# Patient Record
Sex: FEMALE | Race: WHITE | NOT HISPANIC OR LATINO | Employment: UNEMPLOYED | URBAN - METROPOLITAN AREA
[De-identification: names, ages, dates, MRNs, and addresses within clinical notes are randomized per-mention and may not be internally consistent; named-entity substitution may affect disease eponyms.]

---

## 2017-10-23 ENCOUNTER — HISTORICAL (OUTPATIENT)
Dept: ADMINISTRATIVE | Facility: HOSPITAL | Age: 57
End: 2017-10-23

## 2018-09-25 ENCOUNTER — HISTORICAL (OUTPATIENT)
Dept: ADMINISTRATIVE | Facility: HOSPITAL | Age: 58
End: 2018-09-25

## 2018-09-27 ENCOUNTER — HISTORICAL (OUTPATIENT)
Dept: ADMINISTRATIVE | Facility: HOSPITAL | Age: 58
End: 2018-09-27

## 2018-11-06 ENCOUNTER — HISTORICAL (OUTPATIENT)
Dept: ADMINISTRATIVE | Facility: HOSPITAL | Age: 58
End: 2018-11-06

## 2019-08-01 ENCOUNTER — HISTORICAL (OUTPATIENT)
Dept: ADMINISTRATIVE | Facility: HOSPITAL | Age: 59
End: 2019-08-01

## 2019-09-12 ENCOUNTER — HISTORICAL (OUTPATIENT)
Dept: ADMINISTRATIVE | Facility: HOSPITAL | Age: 59
End: 2019-09-12

## 2019-10-17 ENCOUNTER — HISTORICAL (OUTPATIENT)
Dept: ADMINISTRATIVE | Facility: HOSPITAL | Age: 59
End: 2019-10-17

## 2019-11-07 ENCOUNTER — HISTORICAL (OUTPATIENT)
Dept: ADMINISTRATIVE | Facility: HOSPITAL | Age: 59
End: 2019-11-07

## 2022-04-09 ENCOUNTER — HISTORICAL (OUTPATIENT)
Dept: ADMINISTRATIVE | Facility: HOSPITAL | Age: 62
End: 2022-04-09

## 2022-04-27 VITALS
SYSTOLIC BLOOD PRESSURE: 118 MMHG | DIASTOLIC BLOOD PRESSURE: 81 MMHG | BODY MASS INDEX: 54.69 KG/M2 | HEIGHT: 55 IN | WEIGHT: 236.31 LBS | OXYGEN SATURATION: 94 %

## 2022-04-30 NOTE — PROGRESS NOTES
Patient:   Andreea Thompson            MRN: [id number]            FIN: [id number]               Age:   58 years     Sex:  Female     :  1960   Associated Diagnoses:   None   Author:   Charles Guzman III, MD      History of Present Illness   58-year-old female 3 weeks status post ORIF of the right distal radius. We saw her 2 days ago and she is doing okay but she's had increased pain since then. She denies drainage or fevers. she has not been able obtain a wrist brace secondary to finances. We have written her prescription 2 days ago.      Review of Systems   Constitutional:  No fever, No chills, No sweats.    Eye:  Negative except as documented in history of present illness.    Ear/Nose/Mouth/Throat:  Negative except as documented in history of present illness.    Respiratory:  No shortness of breath, No cough.    Cardiovascular:  No chest pain, No palpitations.    Gastrointestinal:  No nausea, No vomiting, No diarrhea.    Genitourinary:  Negative except as documented in history of present illness.    Hematology/Lymphatics:  Negative except as documented in history of present illness.    Endocrine:  Negative except as documented in history of present illness.    Immunologic:  Negative except as documented in history of present illness.    Musculoskeletal:  Negative except as documented in history of present illness.    Integumentary:  Negative except as documented in history of present illness.    Neurologic:  Alert and oriented X4, No confusion, No numbness, No tingling.       Histories   Past Medical History:    Resolved  HTN - Hypertension ([id number]):  Resolved.  Hernia ([id number]):  Resolved.  COPD (chronic obstructive pulmonary disease) ([id number]):  Resolved.  Goodpasture's syndrome ([id number]):  Resolved.  DVT - Deep vein thrombosis ([id number]):  Resolved.  Chemotherapy ([id number]):  Resolved.   Family History:    Entire family history is negative.   Procedure  history:    ORIF Wrist (Right) on 9/2/2018 at 58 Years.  Comments:  9/2/2018 12:53 - Triston WALLACE, Marisabel Foreman  auto-populated from documented surgical case  ORIF Radius Distal (Left) on 9/14/2017 at 57 Years.  Comments:  9/14/2017 19:35 - Jono WALLACE, Srinivasa Rodriguez  auto-populated from documented surgical case  Hiatus hernia repair ([id number]).  tonsillectomy.  Adenoidectomy ([id number]).  labia removal.  Biopsy of lung ([id number]).  Kidney biopsy ([id number]).   Social History        Social & Psychosocial Habits    Alcohol  08/02/2014 Risk Assessment: Denies Alcohol Use    03/04/2017  Use: Current    Type: Wine    Frequency: 1-2 times per month    Substance Abuse  08/02/2014 Risk Assessment: Denies Substance Abuse    05/16/2018  Use: Never    Tobacco  08/02/2014 Risk Assessment: High Risk    09/27/2018  Use: Former smoker    Smoking Cessation Counseling No  .        Physical Examination   right upper extremity: Her incision is beautifully healed. There is no erythema there is no induration, there is no sign of infection. She is able to flex and extend all of her fingers and thumb though it is painful. It appears that the pain is mostly in the tendons of the wrist. she has brisk capillary refill of all her digits. She has some mild numbness that is diffuse in different regions of the hand. Nothing specific to the median nerve      Review / Management   x-rays: Multiple x-rays of the right wrist are obtained today. There is no loss of reduction. The hardware does not appear loose. Nothing appears to have failed      Impression and Plan   58-year-old female with right wrist pain 3 weeks after ORIF of a right distal radius fracture  -I discussed with her that I do not see signs of infection clinically. Her x-rays show preserved alignment and reduction as well as appropriate hardware placement  -We have discussed taking vitamin C to prevent RSD and I have written her prescription for this, though she states she was  supposed to be on vitamin C before but does not appear very compliant with this  -on for so she has not been able to obtain a wrist brace secondary to finances.  I have found a brace in our clinic and fit and applied it myself.  This was done at no charge to the patient  -I recommend that she rest the arm and use ice and elevation to help with the pain.  -I think the numbness she is describing is more likely a result of the nerve block she had in her upper arm. It is not specific to the median nerve and the numbness on the back of her hand would not be explained by a volar David approach to the wrist  -We will check back with her in a week to evaluate her progress

## 2022-09-14 ENCOUNTER — HOSPITAL ENCOUNTER (INPATIENT)
Facility: HOSPITAL | Age: 62
LOS: 3 days | Discharge: HOME HEALTH | DRG: 291 | End: 2022-09-17
Attending: EMERGENCY MEDICINE | Admitting: INTERNAL MEDICINE
Payer: MEDICARE

## 2022-09-14 DIAGNOSIS — Z91.148 NONCOMPLIANCE WITH MEDICATION REGIMEN: ICD-10-CM

## 2022-09-14 DIAGNOSIS — R07.9 CHEST PAIN: ICD-10-CM

## 2022-09-14 DIAGNOSIS — E87.5 HYPERKALEMIA: ICD-10-CM

## 2022-09-14 DIAGNOSIS — R52 PAIN: ICD-10-CM

## 2022-09-14 DIAGNOSIS — R79.89 ELEVATED BRAIN NATRIURETIC PEPTIDE (BNP) LEVEL: ICD-10-CM

## 2022-09-14 DIAGNOSIS — R06.00 DYSPNEA: ICD-10-CM

## 2022-09-14 DIAGNOSIS — I50.811 ACUTE RIGHT-SIDED CONGESTIVE HEART FAILURE: ICD-10-CM

## 2022-09-14 DIAGNOSIS — J96.21 ACUTE ON CHRONIC RESPIRATORY FAILURE WITH HYPOXEMIA: Primary | ICD-10-CM

## 2022-09-14 LAB
ALBUMIN SERPL-MCNC: 3.5 GM/DL (ref 3.4–4.8)
ALBUMIN/GLOB SERPL: 0.9 RATIO (ref 1.1–2)
ALP SERPL-CCNC: 98 UNIT/L (ref 40–150)
ALT SERPL-CCNC: 11 UNIT/L (ref 0–55)
AST SERPL-CCNC: 31 UNIT/L (ref 5–34)
BASOPHILS # BLD AUTO: 0.08 X10(3)/MCL (ref 0–0.2)
BASOPHILS NFR BLD AUTO: 0.8 %
BILIRUBIN DIRECT+TOT PNL SERPL-MCNC: 0.2 MG/DL
BNP BLD-MCNC: 3117.5 PG/ML
BUN SERPL-MCNC: 31.2 MG/DL (ref 9.8–20.1)
CALCIUM SERPL-MCNC: 9.3 MG/DL (ref 8.4–10.2)
CHLORIDE SERPL-SCNC: 107 MMOL/L (ref 98–107)
CO2 SERPL-SCNC: 27 MMOL/L (ref 23–31)
CREAT SERPL-MCNC: 1.55 MG/DL (ref 0.55–1.02)
D DIMER PPP IA.FEU-MCNC: 0.74 UG/ML FEU (ref 0–0.5)
EOSINOPHIL # BLD AUTO: 0.3 X10(3)/MCL (ref 0–0.9)
EOSINOPHIL NFR BLD AUTO: 2.9 %
ERYTHROCYTE [DISTWIDTH] IN BLOOD BY AUTOMATED COUNT: 14.9 % (ref 11.5–17)
FLUAV AG UPPER RESP QL IA.RAPID: NOT DETECTED
FLUBV AG UPPER RESP QL IA.RAPID: NOT DETECTED
GFR SERPLBLD CREATININE-BSD FMLA CKD-EPI: 37 MLS/MIN/1.73/M2
GLOBULIN SER-MCNC: 4 GM/DL (ref 2.4–3.5)
GLUCOSE SERPL-MCNC: 125 MG/DL (ref 82–115)
HCT VFR BLD AUTO: 46.1 % (ref 37–47)
HGB BLD-MCNC: 12.4 GM/DL (ref 12–16)
IMM GRANULOCYTES # BLD AUTO: 0.06 X10(3)/MCL (ref 0–0.04)
IMM GRANULOCYTES NFR BLD AUTO: 0.6 %
LYMPHOCYTES # BLD AUTO: 1.59 X10(3)/MCL (ref 0.6–4.6)
LYMPHOCYTES NFR BLD AUTO: 15.5 %
MACROCYTES BLD QL SMEAR: ABNORMAL
MCH RBC QN AUTO: 28.4 PG (ref 27–31)
MCHC RBC AUTO-ENTMCNC: 26.9 MG/DL (ref 33–36)
MCV RBC AUTO: 105.7 FL (ref 80–94)
MONOCYTES # BLD AUTO: 0.53 X10(3)/MCL (ref 0.1–1.3)
MONOCYTES NFR BLD AUTO: 5.2 %
NEUTROPHILS # BLD AUTO: 7.7 X10(3)/MCL (ref 2.1–9.2)
NEUTROPHILS NFR BLD AUTO: 75 %
NRBC BLD AUTO-RTO: 0 %
PLATELET # BLD AUTO: 233 X10(3)/MCL (ref 130–400)
PLATELET # BLD EST: NORMAL 10*3/UL
PMV BLD AUTO: 11.1 FL (ref 7.4–10.4)
POTASSIUM SERPL-SCNC: 5.8 MMOL/L (ref 3.5–5.1)
PROT SERPL-MCNC: 7.5 GM/DL (ref 5.8–7.6)
RBC # BLD AUTO: 4.36 X10(6)/MCL (ref 4.2–5.4)
RBC MORPH BLD: ABNORMAL
SARS-COV-2 RNA RESP QL NAA+PROBE: NOT DETECTED
SODIUM SERPL-SCNC: 138 MMOL/L (ref 136–145)
TROPONIN I SERPL-MCNC: 0.01 NG/ML (ref 0–0.04)
WBC # SPEC AUTO: 10.3 X10(3)/MCL (ref 4.5–11.5)

## 2022-09-14 PROCEDURE — 25000242 PHARM REV CODE 250 ALT 637 W/ HCPCS: Performed by: EMERGENCY MEDICINE

## 2022-09-14 PROCEDURE — 63600175 PHARM REV CODE 636 W HCPCS: Performed by: NURSE PRACTITIONER

## 2022-09-14 PROCEDURE — 63600175 PHARM REV CODE 636 W HCPCS: Performed by: EMERGENCY MEDICINE

## 2022-09-14 PROCEDURE — 84484 ASSAY OF TROPONIN QUANT: CPT | Performed by: PHYSICIAN ASSISTANT

## 2022-09-14 PROCEDURE — 85025 COMPLETE CBC W/AUTO DIFF WBC: CPT | Performed by: PHYSICIAN ASSISTANT

## 2022-09-14 PROCEDURE — 83880 ASSAY OF NATRIURETIC PEPTIDE: CPT | Performed by: PHYSICIAN ASSISTANT

## 2022-09-14 PROCEDURE — 93010 ELECTROCARDIOGRAM REPORT: CPT | Mod: ,,, | Performed by: INTERNAL MEDICINE

## 2022-09-14 PROCEDURE — 94640 AIRWAY INHALATION TREATMENT: CPT

## 2022-09-14 PROCEDURE — 25500020 PHARM REV CODE 255: Performed by: EMERGENCY MEDICINE

## 2022-09-14 PROCEDURE — 80053 COMPREHEN METABOLIC PANEL: CPT | Performed by: PHYSICIAN ASSISTANT

## 2022-09-14 PROCEDURE — 99291 CRITICAL CARE FIRST HOUR: CPT | Mod: 25

## 2022-09-14 PROCEDURE — 11000001 HC ACUTE MED/SURG PRIVATE ROOM

## 2022-09-14 PROCEDURE — 93005 ELECTROCARDIOGRAM TRACING: CPT

## 2022-09-14 PROCEDURE — 93010 EKG 12-LEAD: ICD-10-PCS | Mod: ,,, | Performed by: INTERNAL MEDICINE

## 2022-09-14 PROCEDURE — 85379 FIBRIN DEGRADATION QUANT: CPT | Performed by: PHYSICIAN ASSISTANT

## 2022-09-14 PROCEDURE — 96374 THER/PROPH/DIAG INJ IV PUSH: CPT

## 2022-09-14 PROCEDURE — 25000003 PHARM REV CODE 250: Performed by: NURSE PRACTITIONER

## 2022-09-14 PROCEDURE — 87636 SARSCOV2 & INF A&B AMP PRB: CPT | Performed by: PHYSICIAN ASSISTANT

## 2022-09-14 PROCEDURE — 36415 COLL VENOUS BLD VENIPUNCTURE: CPT | Performed by: PHYSICIAN ASSISTANT

## 2022-09-14 RX ORDER — MAG HYDROX/ALUMINUM HYD/SIMETH 200-200-20
30 SUSPENSION, ORAL (FINAL DOSE FORM) ORAL 4 TIMES DAILY PRN
Status: DISCONTINUED | OUTPATIENT
Start: 2022-09-14 | End: 2022-09-17 | Stop reason: HOSPADM

## 2022-09-14 RX ORDER — IBUPROFEN 200 MG
24 TABLET ORAL
Status: DISCONTINUED | OUTPATIENT
Start: 2022-09-14 | End: 2022-09-17 | Stop reason: HOSPADM

## 2022-09-14 RX ORDER — PRAVASTATIN SODIUM 40 MG/1
40 TABLET ORAL DAILY PRN
COMMUNITY
Start: 2022-08-23 | End: 2023-03-18

## 2022-09-14 RX ORDER — NALOXONE HCL 0.4 MG/ML
0.02 VIAL (ML) INJECTION
Status: DISCONTINUED | OUTPATIENT
Start: 2022-09-14 | End: 2022-09-17 | Stop reason: HOSPADM

## 2022-09-14 RX ORDER — LOSARTAN POTASSIUM 50 MG/1
50 TABLET ORAL DAILY
COMMUNITY
Start: 2022-08-23 | End: 2023-03-18

## 2022-09-14 RX ORDER — POLYETHYLENE GLYCOL 3350 17 G/17G
17 POWDER, FOR SOLUTION ORAL DAILY
Status: DISCONTINUED | OUTPATIENT
Start: 2022-09-15 | End: 2022-09-17 | Stop reason: HOSPADM

## 2022-09-14 RX ORDER — MIRTAZAPINE 30 MG/1
30 TABLET, FILM COATED ORAL NIGHTLY
COMMUNITY
Start: 2022-08-02

## 2022-09-14 RX ORDER — FLUTICASONE FUROATE, UMECLIDINIUM BROMIDE AND VILANTEROL TRIFENATATE 100; 62.5; 25 UG/1; UG/1; UG/1
1 POWDER RESPIRATORY (INHALATION) DAILY
COMMUNITY
Start: 2022-07-05 | End: 2023-03-18

## 2022-09-14 RX ORDER — ACETAMINOPHEN 325 MG/1
650 TABLET ORAL EVERY 6 HOURS PRN
Status: DISCONTINUED | OUTPATIENT
Start: 2022-09-14 | End: 2022-09-17 | Stop reason: HOSPADM

## 2022-09-14 RX ORDER — ALBUTEROL SULFATE 90 UG/1
2 AEROSOL, METERED RESPIRATORY (INHALATION) EVERY 6 HOURS PRN
COMMUNITY
Start: 2021-12-08

## 2022-09-14 RX ORDER — SIMETHICONE 80 MG
1 TABLET,CHEWABLE ORAL 4 TIMES DAILY PRN
Status: DISCONTINUED | OUTPATIENT
Start: 2022-09-14 | End: 2022-09-17 | Stop reason: HOSPADM

## 2022-09-14 RX ORDER — IPRATROPIUM BROMIDE AND ALBUTEROL SULFATE 2.5; .5 MG/3ML; MG/3ML
3 SOLUTION RESPIRATORY (INHALATION)
Status: COMPLETED | OUTPATIENT
Start: 2022-09-14 | End: 2022-09-14

## 2022-09-14 RX ORDER — ALPRAZOLAM 0.5 MG/1
1 TABLET ORAL ONCE
Status: COMPLETED | OUTPATIENT
Start: 2022-09-14 | End: 2022-09-14

## 2022-09-14 RX ORDER — OXYBUTYNIN CHLORIDE 5 MG/1
5 TABLET ORAL DAILY
COMMUNITY
Start: 2022-08-23

## 2022-09-14 RX ORDER — ENOXAPARIN SODIUM 100 MG/ML
40 INJECTION SUBCUTANEOUS EVERY 24 HOURS
Status: DISCONTINUED | OUTPATIENT
Start: 2022-09-14 | End: 2022-09-17 | Stop reason: HOSPADM

## 2022-09-14 RX ORDER — METOPROLOL TARTRATE 25 MG/1
TABLET, FILM COATED ORAL
COMMUNITY
Start: 2021-09-20 | End: 2023-03-18

## 2022-09-14 RX ORDER — IBUPROFEN 200 MG
16 TABLET ORAL
Status: DISCONTINUED | OUTPATIENT
Start: 2022-09-14 | End: 2022-09-17 | Stop reason: HOSPADM

## 2022-09-14 RX ORDER — IPRATROPIUM BROMIDE AND ALBUTEROL SULFATE 2.5; .5 MG/3ML; MG/3ML
3 SOLUTION RESPIRATORY (INHALATION) EVERY 6 HOURS PRN
COMMUNITY
End: 2024-03-25

## 2022-09-14 RX ORDER — ACETAZOLAMIDE 250 MG/1
TABLET ORAL DAILY PRN
COMMUNITY
Start: 2022-08-23

## 2022-09-14 RX ORDER — FUROSEMIDE 10 MG/ML
40 INJECTION INTRAMUSCULAR; INTRAVENOUS
Status: COMPLETED | OUTPATIENT
Start: 2022-09-14 | End: 2022-09-14

## 2022-09-14 RX ORDER — FOLIC ACID 1 MG/1
1000 TABLET ORAL DAILY
COMMUNITY
Start: 2022-08-23

## 2022-09-14 RX ORDER — IPRATROPIUM BROMIDE AND ALBUTEROL SULFATE 2.5; .5 MG/3ML; MG/3ML
3 SOLUTION RESPIRATORY (INHALATION) EVERY 4 HOURS PRN
Status: DISCONTINUED | OUTPATIENT
Start: 2022-09-14 | End: 2022-09-17 | Stop reason: HOSPADM

## 2022-09-14 RX ORDER — FUROSEMIDE 20 MG/1
20 TABLET ORAL DAILY PRN
COMMUNITY
Start: 2021-09-20

## 2022-09-14 RX ORDER — CYANOCOBALAMIN 1000 UG/ML
INJECTION, SOLUTION INTRAMUSCULAR; SUBCUTANEOUS
COMMUNITY
Start: 2021-10-20

## 2022-09-14 RX ORDER — AMLODIPINE BESYLATE 5 MG/1
1 TABLET ORAL DAILY
COMMUNITY
Start: 2022-01-14 | End: 2023-03-17

## 2022-09-14 RX ORDER — ONDANSETRON 2 MG/ML
4 INJECTION INTRAMUSCULAR; INTRAVENOUS EVERY 4 HOURS PRN
Status: DISCONTINUED | OUTPATIENT
Start: 2022-09-14 | End: 2022-09-17 | Stop reason: HOSPADM

## 2022-09-14 RX ORDER — PANTOPRAZOLE SODIUM 40 MG/1
40 TABLET, DELAYED RELEASE ORAL DAILY
COMMUNITY
Start: 2022-08-23

## 2022-09-14 RX ORDER — FLUOXETINE HYDROCHLORIDE 40 MG/1
80 CAPSULE ORAL
COMMUNITY

## 2022-09-14 RX ORDER — HYDROCODONE BITARTRATE AND ACETAMINOPHEN 10; 325 MG/1; MG/1
1 TABLET ORAL ONCE
Status: COMPLETED | OUTPATIENT
Start: 2022-09-14 | End: 2022-09-14

## 2022-09-14 RX ORDER — TALC
6 POWDER (GRAM) TOPICAL NIGHTLY PRN
Status: DISCONTINUED | OUTPATIENT
Start: 2022-09-14 | End: 2022-09-17 | Stop reason: HOSPADM

## 2022-09-14 RX ORDER — GLUCAGON 1 MG
1 KIT INJECTION
Status: DISCONTINUED | OUTPATIENT
Start: 2022-09-14 | End: 2022-09-17 | Stop reason: HOSPADM

## 2022-09-14 RX ORDER — ALPRAZOLAM 2 MG/1
2 TABLET ORAL 2 TIMES DAILY
COMMUNITY
Start: 2022-09-08

## 2022-09-14 RX ORDER — ALENDRONATE SODIUM 70 MG/1
TABLET ORAL
COMMUNITY
Start: 2021-09-29 | End: 2023-03-18

## 2022-09-14 RX ORDER — PROCHLORPERAZINE EDISYLATE 5 MG/ML
5 INJECTION INTRAMUSCULAR; INTRAVENOUS EVERY 6 HOURS PRN
Status: DISCONTINUED | OUTPATIENT
Start: 2022-09-14 | End: 2022-09-17 | Stop reason: HOSPADM

## 2022-09-14 RX ORDER — HYDROCODONE BITARTRATE AND ACETAMINOPHEN 10; 325 MG/1; MG/1
1 TABLET ORAL 2 TIMES DAILY PRN
COMMUNITY
Start: 2022-09-08

## 2022-09-14 RX ORDER — FUROSEMIDE 10 MG/ML
40 INJECTION INTRAMUSCULAR; INTRAVENOUS EVERY 8 HOURS
Status: DISCONTINUED | OUTPATIENT
Start: 2022-09-14 | End: 2022-09-16

## 2022-09-14 RX ADMIN — FUROSEMIDE 40 MG: 10 INJECTION, SOLUTION INTRAMUSCULAR; INTRAVENOUS at 05:09

## 2022-09-14 RX ADMIN — FUROSEMIDE 40 MG: 10 INJECTION, SOLUTION INTRAMUSCULAR; INTRAVENOUS at 10:09

## 2022-09-14 RX ADMIN — IOPAMIDOL 100 ML: 755 INJECTION, SOLUTION INTRAVENOUS at 06:09

## 2022-09-14 RX ADMIN — HYDROCODONE BITARTRATE AND ACETAMINOPHEN 1 TABLET: 10; 325 TABLET ORAL at 10:09

## 2022-09-14 RX ADMIN — IPRATROPIUM BROMIDE AND ALBUTEROL SULFATE 3 ML: 2.5; .5 SOLUTION RESPIRATORY (INHALATION) at 05:09

## 2022-09-14 RX ADMIN — ENOXAPARIN SODIUM 40 MG: 40 INJECTION SUBCUTANEOUS at 10:09

## 2022-09-14 RX ADMIN — ALPRAZOLAM 1 MG: 0.5 TABLET ORAL at 10:09

## 2022-09-14 NOTE — ED PROVIDER NOTES
Encounter Date: 9/14/2022    SCRIBE #1 NOTE: I, Ant Crow, am scribing for, and in the presence of,  Mary Pryor MD. I have scribed the following portions of the note - Other sections scribed: HPI, ROS, physical exam.     History     Chief Complaint   Patient presents with    Dizziness    Shortness of Breath     SOB than usual x 2 days dizziness, COPD, on home oxygen at 2-3lpm via [place], PMH Afib, PE, wears trilogy at night, 89% at 2lpm , increased to 3 in triage, improved to 89-90%, per pt baseline at home     61 y/o female with a history of A-Fib, COPD, and PE presenting to the ED for SOB which is chronic but has been gradually worsening for about a month. Pt also states she had an episode of dizziness and weakness this morning. Pt does report some worsened leg swelling, cough, and decreased appetite but denies any CP or chest pressure. Pt states she had a PE 3-4 months ago at which time she had chest pressure which she denies currently. Pt states she is on 2-4L O2 at baseline but feels like she needs more currently. She states she has nebulizer treatments at home which help. Pt states she is supposed to take lasix and diamox but states she hasn't been taking the lasix because it makes her urinate too much. She also states she has not been taking her bloodthinners x 1 month because they are too expensive.     Pt's PCP is Jamaal Whitney MD. Her cardiologist is Deandre Salguero MD.    The history is provided by the patient. No  was used.   Shortness of Breath  This is a chronic problem. The problem occurs continuously.The current episode started more than 1 week ago. The problem has been gradually worsening. Associated symptoms include cough, orthopnea (chronic) and leg swelling. Pertinent negatives include no fever, no rhinorrhea, no sore throat, no wheezing, no chest pain and no abdominal pain. Treatments tried: nebulizers. The treatment provided moderate relief. She has had Prior  hospitalizations. She has had Prior ED visits. Associated medical issues include COPD and PE.   Review of patient's allergies indicates:  No Known Allergies  Past Medical History:   Diagnosis Date    COPD (chronic obstructive pulmonary disease)     Paroxysmal atrial fibrillation      No past surgical history on file.  No family history on file.     Review of Systems   Constitutional:  Positive for appetite change (decreased). Negative for activity change, diaphoresis, fatigue and fever.   HENT:  Negative for congestion, postnasal drip, rhinorrhea, sinus pain, sneezing and sore throat.    Respiratory:  Positive for cough and shortness of breath. Negative for chest tightness and wheezing.    Cardiovascular:  Positive for orthopnea (chronic) and leg swelling. Negative for chest pain and palpitations.   Gastrointestinal:  Negative for abdominal distention, abdominal pain and blood in stool.   Genitourinary:  Negative for decreased urine volume, difficulty urinating and dysuria.   Musculoskeletal: Negative.    Skin:  Negative for color change and pallor.   Neurological:  Positive for light-headedness. Negative for dizziness, speech difficulty, weakness and numbness.   All other systems reviewed and are negative.    Physical Exam     Initial Vitals [09/14/22 1258]   BP Pulse Resp Temp SpO2   115/77 64 18 98.2 °F (36.8 °C) (!) 89 %      MAP       --         Physical Exam    Nursing note and vitals reviewed.  Constitutional: She appears well-developed and well-nourished. She is not diaphoretic. No distress.   HENT:   Head: Normocephalic and atraumatic.   Nose: Nose normal.   Mouth/Throat: Oropharynx is clear and moist.   Eyes: Conjunctivae and EOM are normal. Pupils are equal, round, and reactive to light.   Neck: Trachea normal. Neck supple.   Normal range of motion.  Cardiovascular:  Normal rate, regular rhythm, normal heart sounds and intact distal pulses.           No murmur heard.  Pulmonary/Chest: Tachypnea noted. She  is in respiratory distress. She has no wheezes. She has no rhonchi. She has rales in the right lower field and the left lower field. She exhibits no tenderness.   Prolonged expiratory phase   Abdominal: Abdomen is soft. Bowel sounds are normal. She exhibits no distension and no mass. There is no abdominal tenderness. There is no rebound and no guarding.   Musculoskeletal:         General: No tenderness or edema. Normal range of motion.      Cervical back: Normal range of motion and neck supple.      Lumbar back: Normal. Normal range of motion.     Neurological: She is alert and oriented to person, place, and time. She has normal strength. No cranial nerve deficit or sensory deficit.   Skin: Skin is warm and dry. Capillary refill takes less than 2 seconds. No abscess noted. No erythema. No pallor.   Psychiatric: She has a normal mood and affect. Her behavior is normal. Judgment and thought content normal.       ED Course   Critical Care    Date/Time: 9/14/2022 8:17 PM  Performed by: Mary Pryor MD  Authorized by: Mary Pryor MD   Direct patient critical care time: 15 minutes  Additional history critical care time: 5 minutes  Ordering / reviewing critical care time: 10 minutes  Documentation critical care time: 10 minutes  Consulting other physicians critical care time: 5 minutes  Total critical care time (exclusive of procedural time) : 45 minutes  Critical care was necessary to treat or prevent imminent or life-threatening deterioration of the following conditions: cardiac failure, respiratory failure and metabolic crisis.  Critical care was time spent personally by me on the following activities: discussions with consultants, development of treatment plan with patient or surrogate, evaluation of patient's response to treatment, examination of patient, obtaining history from patient or surrogate, ordering and performing treatments and interventions, ordering and review of laboratory studies, ordering  and review of radiographic studies, pulse oximetry, re-evaluation of patient's condition and review of old charts.      Labs Reviewed   COMPREHENSIVE METABOLIC PANEL - Abnormal; Notable for the following components:       Result Value    Potassium Level 5.8 (*)     Glucose Level 125 (*)     Blood Urea Nitrogen 31.2 (*)     Creatinine 1.55 (*)     Globulin 4.0 (*)     Albumin/Globulin Ratio 0.9 (*)     All other components within normal limits   B-TYPE NATRIURETIC PEPTIDE - Abnormal; Notable for the following components:    Natriuretic Peptide 3,117.5 (*)     All other components within normal limits   D DIMER, QUANTITATIVE - Abnormal; Notable for the following components:    D-Dimer 0.74 (*)     All other components within normal limits   CBC WITH DIFFERENTIAL - Abnormal; Notable for the following components:    .7 (*)     MCHC 26.9 (*)     MPV 11.1 (*)     IG# 0.06 (*)     All other components within normal limits   BLOOD SMEAR MICROSCOPIC EXAM (OLG) - Abnormal; Notable for the following components:    RBC Morph Abnormal (*)     Macrocyte 1+ (*)     All other components within normal limits   COVID/FLU A&B PCR - Normal   TROPONIN I - Normal   CBC W/ AUTO DIFFERENTIAL    Narrative:     The following orders were created for panel order CBC auto differential.  Procedure                               Abnormality         Status                     ---------                               -----------         ------                     CBC with Differential[[id number]]        Abnormal            Final result                 Please view results for these tests on the individual orders.        ECG Results              EKG 12-lead (Final result)  Result time 09/14/22 14:12:19      Final result by Interface, Lab In [facility] (09/14/22 14:12:19)                   Narrative:    Test Reason : R06.00,    Vent. Rate : 065 BPM     Atrial Rate : 065 BPM     P-R Int : 158 ms          QRS Dur : 096 ms      QT Int : 404 ms        P-R-T Axes : 074 106 -60 degrees     QTc Int : 420 ms    Normal sinus rhythm  Rightward axis  Low voltage QRS  Incomplete right bundle branch block  ST and T wave abnormality, consider inferior ischemia  ST and T wave abnormality, consider anterolateral ischemia  Abnormal ECG  No previous ECGs available  Confirmed by Teo Danielle MD ([id number]) on 9/14/2022 2:12:07 PM    Referred By:             Confirmed By:Teo Danielle MD                                  Imaging Results              CTA Chest Non-Coronary(PE Studies) (Final result)  Result time 09/14/22 18:22:48      Final result by Tiara Pennington MD (09/14/22 18:22:48)                   Impression:      No pulmonary embolus is seen but there is evidence of pulmonary artery hypertension and right heart failure    Multiple old left-sided rib fractures      Electronically signed by: Tiara Pennington  Date:    09/14/2022  Time:    18:22               Narrative:    EXAMINATION:  CTA CHEST NON CORONARY    CLINICAL HISTORY:  Pulmonary embolism (PE) suspected, high prob;    TECHNIQUE:  Low dose axial images, sagittal and coronal reformations were obtained from the thoracic inlet to the lung bases following the IV administration of contrast..  Contrast timing was optimized to evaluate the pulmonary arteries.  MIP images were performed.  Automated exposure control was utilized    COMPARISON:  None available    FINDINGS:  There is some chronic appearing interstitial lung changes seen bilaterally.  No pulmonary embolism is seen.  The pulmonary artery is dilated and enlarged consistent with pulmonary artery hypertension.  There is enlargement of the right atrium and reflux of contrast into the inferior vena cava consistent with right heart dysfunction.  No pleural effusion is seen.    There are multiple old left-sided rib fracture seen.  Thoracic aorta appears normal.    Upper abdomen appears grossly unremarkable.                                       X-Ray  Chest 1 View (Final result)  Result time 09/14/22 14:21:11      Final result by Juan Miguel Jefferson MD (09/14/22 14:21:11)                   Impression:      Congestive changes.      Electronically signed by: Juan Miguel Jefferson  Date:    09/14/2022  Time:    14:21               Narrative:    EXAMINATION:  XR CHEST 1 VIEW    CLINICAL HISTORY:  Dyspnea, unspecified    TECHNIQUE:  One view    COMPARISON:  September 20, 2021.    FINDINGS:  Cardiopericardial silhouette enlarged appearance is similar.  Lungs ground-glass opacities reflect mild to moderate congestive process.  There is no focally dense consolidation.  Minimal right and small left pleural effusion.  No pneumothorax.  Multiple old fractures of the left ribs.                                    X-Rays:   Independently Interpreted Readings:   Chest X-Ray: Cardiomegaly present.  Increased vascular markings consistent with CHF are present.   Medications   furosemide injection 40 mg (40 mg Intravenous Given 9/14/22 1700)   albuterol-ipratropium 2.5 mg-0.5 mg/3 mL nebulizer solution 3 mL (3 mLs Nebulization Given 9/14/22 1736)   iopamidoL (ISOVUE-370) injection 100 mL (100 mLs Intravenous Given 9/14/22 1813)     Medical Decision Making:   History:   Old Medical Records: I decided to obtain old medical records.  Old Records Summarized: records from clinic visits.       <> Summary of Records: H/o goodpastures, covid, hypoxia, pe  Initial Assessment:   Shortness of beath, edema  Differential Diagnosis:   Chf, acs, pe, pneumonia, copd exacerbation  Independently Interpreted Test(s):   I have ordered and independently interpreted X-rays - see prior notes.  I have ordered and independently interpreted EKG Reading(s) - see prior notes  Clinical Tests:   Lab Tests: Ordered and Reviewed  Radiological Study: Ordered and Reviewed  Medical Tests: Ordered and Reviewed  ED Management:  Increased oxygen requirement. Lasix, duoneb, cta due to elevated d dimer obtained in triage and lack of  compliance with anticoagulation, labs, admit  Other:   I have discussed this case with another health care provider.        Scribe Attestation:   Scribe #1: I performed the above scribed service and the documentation accurately describes the services I performed. I attest to the accuracy of the note.  Comments: Attending:   Physician Attestation Statement for Scribe #1: IMary MD, personally performed the services described in this documentation. All medical record entries made by the scribe were at my direction and in my presence.  I have reviewed the chart and agree that the record reflects my personal performance and is accurate and complete.        Attending Attestation:           Physician Attestation for Scribe:  Physician Attestation Statement for Scribe #1: IMary MD, reviewed documentation, as scribed by Ant Crow in my presence, and it is both accurate and complete.           ED Course as of 09/14/22 2019   Wed Sep 14, 2022   1748 Awaiting CTA then will admit [BS]      ED Course User Index  [BS] Mary Pryor MD                 Clinical Impression:   Final diagnoses:  [R06.00] Dyspnea  [J96.21] Acute on chronic respiratory failure with hypoxemia (Primary)  [E87.5] Hyperkalemia  [I50.811] Acute right-sided congestive heart failure  [Z91.14] Noncompliance with medication regimen        ED Disposition Condition    Admit Stable                Mary Pryor MD  09/14/22 2019

## 2022-09-14 NOTE — FIRST PROVIDER EVALUATION
Medical screening examination initiated.  I have conducted a focused provider triage encounter, findings are as follows:    Brief history of present illness:  Female with afib, COPD worsening dyspnea sent from clinic to ER for further evaluation out of Eliquis one month.    There were no vitals filed for this visit.    Pertinent physical exam:  AAOx4 female on nasal cannula with short sentences    Brief workup plan:  labs, EKG, imaging    Preliminary workup initiated; this workup will be continued and followed by the physician or advanced practice provider that is assigned to the patient when roomed.

## 2022-09-15 LAB
ALBUMIN SERPL-MCNC: 3.3 GM/DL (ref 3.4–4.8)
ALBUMIN/GLOB SERPL: 1 RATIO (ref 1.1–2)
ALP SERPL-CCNC: 91 UNIT/L (ref 40–150)
ALT SERPL-CCNC: 11 UNIT/L (ref 0–55)
AST SERPL-CCNC: 16 UNIT/L (ref 5–34)
AV INDEX (PROSTH): 0.5
AV MEAN GRADIENT: 6 MMHG
AV PEAK GRADIENT: 11 MMHG
AV VALVE AREA: 1.57 CM2
AV VELOCITY RATIO: 0.47
BASOPHILS # BLD AUTO: 0.09 X10(3)/MCL (ref 0–0.2)
BASOPHILS NFR BLD AUTO: 1 %
BILIRUBIN DIRECT+TOT PNL SERPL-MCNC: <0.5 MG/DL
BUN SERPL-MCNC: 30 MG/DL (ref 9.8–20.1)
CALCIUM SERPL-MCNC: 9 MG/DL (ref 8.4–10.2)
CHLORIDE SERPL-SCNC: 105 MMOL/L (ref 98–107)
CO2 SERPL-SCNC: 29 MMOL/L (ref 23–31)
CREAT SERPL-MCNC: 1.33 MG/DL (ref 0.55–1.02)
CV ECHO LV RWT: 0.48 CM
DOP CALC AO PEAK VEL: 1.64 M/S
DOP CALC AO VTI: 32.3 CM
DOP CALC LVOT AREA: 3.1 CM2
DOP CALC LVOT DIAMETER: 2 CM
DOP CALC LVOT PEAK VEL: 0.77 M/S
DOP CALC LVOT STROKE VOLUME: 50.55 CM3
DOP CALC MV VTI: 14.7 CM
DOP CALCLVOT PEAK VEL VTI: 16.1 CM
E/A RATIO: 1.03
E/E' RATIO: 14.46 M/S
ECHO LV POSTERIOR WALL: 1.05 CM (ref 0.6–1.1)
EJECTION FRACTION: 61 %
EOSINOPHIL # BLD AUTO: 0.34 X10(3)/MCL (ref 0–0.9)
EOSINOPHIL NFR BLD AUTO: 3.9 %
ERYTHROCYTE [DISTWIDTH] IN BLOOD BY AUTOMATED COUNT: 14.9 % (ref 11.5–17)
FRACTIONAL SHORTENING: 33 % (ref 28–44)
GFR SERPLBLD CREATININE-BSD FMLA CKD-EPI: 45 MLS/MIN/1.73/M2
GLOBULIN SER-MCNC: 3.4 GM/DL (ref 2.4–3.5)
GLUCOSE SERPL-MCNC: 100 MG/DL (ref 82–115)
HCT VFR BLD AUTO: 43.4 % (ref 37–47)
HGB BLD-MCNC: 12.6 GM/DL (ref 12–16)
IMM GRANULOCYTES # BLD AUTO: 0.04 X10(3)/MCL (ref 0–0.04)
IMM GRANULOCYTES NFR BLD AUTO: 0.5 %
INTERVENTRICULAR SEPTUM: 1.11 CM (ref 0.6–1.1)
LEFT ATRIUM SIZE: 4.5 CM
LEFT INTERNAL DIMENSION IN SYSTOLE: 2.94 CM (ref 2.1–4)
LEFT VENTRICLE DIASTOLIC VOLUME INDEX: 45.93 ML/M2
LEFT VENTRICLE DIASTOLIC VOLUME: 86.8 ML
LEFT VENTRICLE MASS INDEX: 86 G/M2
LEFT VENTRICLE SYSTOLIC VOLUME INDEX: 17.6 ML/M2
LEFT VENTRICLE SYSTOLIC VOLUME: 33.3 ML
LEFT VENTRICULAR INTERNAL DIMENSION IN DIASTOLE: 4.38 CM (ref 3.5–6)
LEFT VENTRICULAR MASS: 163.42 G
LV LATERAL E/E' RATIO: 11.75 M/S
LV SEPTAL E/E' RATIO: 18.8 M/S
LVOT MG: 1 MMHG
LVOT MV: 0.49 CM/S
LYMPHOCYTES # BLD AUTO: 2.58 X10(3)/MCL (ref 0.6–4.6)
LYMPHOCYTES NFR BLD AUTO: 29.8 %
MAGNESIUM SERPL-MCNC: 1.9 MG/DL (ref 1.6–2.6)
MCH RBC QN AUTO: 28.8 PG (ref 27–31)
MCHC RBC AUTO-ENTMCNC: 29 MG/DL (ref 33–36)
MCV RBC AUTO: 99.1 FL (ref 80–94)
MONOCYTES # BLD AUTO: 0.54 X10(3)/MCL (ref 0.1–1.3)
MONOCYTES NFR BLD AUTO: 6.2 %
MV MEAN GRADIENT: 1 MMHG
MV PEAK A VEL: 0.91 M/S
MV PEAK E VEL: 0.94 M/S
MV PEAK GRADIENT: 1 MMHG
MV STENOSIS PRESSURE HALF TIME: 80 MS
MV VALVE AREA BY CONTINUITY EQUATION: 3.44 CM2
MV VALVE AREA P 1/2 METHOD: 2.75 CM2
NEUTROPHILS # BLD AUTO: 5.1 X10(3)/MCL (ref 2.1–9.2)
NEUTROPHILS NFR BLD AUTO: 58.6 %
NRBC BLD AUTO-RTO: 0 %
PHOSPHATE SERPL-MCNC: 4.5 MG/DL (ref 2.3–4.7)
PISA TR MAX VEL: 3.55 M/S
PLATELET # BLD AUTO: 203 X10(3)/MCL (ref 130–400)
PMV BLD AUTO: 10.9 FL (ref 7.4–10.4)
POTASSIUM SERPL-SCNC: 4.6 MMOL/L (ref 3.5–5.1)
PROT SERPL-MCNC: 6.7 GM/DL (ref 5.8–7.6)
PV PEAK VELOCITY: 1.01 CM/S
RA MAJOR: 6.72 CM
RA PRESSURE: 15 MMHG
RA WIDTH: 5.91 CM
RBC # BLD AUTO: 4.38 X10(6)/MCL (ref 4.2–5.4)
RIGHT VENTRICULAR END-DIASTOLIC DIMENSION: 3.66 CM
SODIUM SERPL-SCNC: 140 MMOL/L (ref 136–145)
TDI LATERAL: 0.08 M/S
TDI SEPTAL: 0.05 M/S
TDI: 0.07 M/S
TR MAX PG: 50 MMHG
TRICUSPID ANNULAR PLANE SYSTOLIC EXCURSION: 1.5 CM
TROPONIN I SERPL-MCNC: 0.01 NG/ML (ref 0–0.04)
TV REST PULMONARY ARTERY PRESSURE: 65 MMHG
WBC # SPEC AUTO: 8.7 X10(3)/MCL (ref 4.5–11.5)

## 2022-09-15 PROCEDURE — 80053 COMPREHEN METABOLIC PANEL: CPT | Performed by: NURSE PRACTITIONER

## 2022-09-15 PROCEDURE — 63600175 PHARM REV CODE 636 W HCPCS: Performed by: INTERNAL MEDICINE

## 2022-09-15 PROCEDURE — 99900031 HC PATIENT EDUCATION (STAT)

## 2022-09-15 PROCEDURE — 27000221 HC OXYGEN, UP TO 24 HOURS

## 2022-09-15 PROCEDURE — 84100 ASSAY OF PHOSPHORUS: CPT | Performed by: NURSE PRACTITIONER

## 2022-09-15 PROCEDURE — 83735 ASSAY OF MAGNESIUM: CPT | Performed by: NURSE PRACTITIONER

## 2022-09-15 PROCEDURE — 25000242 PHARM REV CODE 250 ALT 637 W/ HCPCS: Performed by: NURSE PRACTITIONER

## 2022-09-15 PROCEDURE — 63600175 PHARM REV CODE 636 W HCPCS: Performed by: NURSE PRACTITIONER

## 2022-09-15 PROCEDURE — 85025 COMPLETE CBC W/AUTO DIFF WBC: CPT | Performed by: NURSE PRACTITIONER

## 2022-09-15 PROCEDURE — 94640 AIRWAY INHALATION TREATMENT: CPT

## 2022-09-15 PROCEDURE — 25000003 PHARM REV CODE 250: Performed by: INTERNAL MEDICINE

## 2022-09-15 PROCEDURE — 36415 COLL VENOUS BLD VENIPUNCTURE: CPT | Performed by: NURSE PRACTITIONER

## 2022-09-15 PROCEDURE — 21400001 HC TELEMETRY ROOM

## 2022-09-15 PROCEDURE — 84484 ASSAY OF TROPONIN QUANT: CPT | Performed by: NURSE PRACTITIONER

## 2022-09-15 PROCEDURE — 94761 N-INVAS EAR/PLS OXIMETRY MLT: CPT

## 2022-09-15 PROCEDURE — 25000003 PHARM REV CODE 250: Performed by: NURSE PRACTITIONER

## 2022-09-15 RX ORDER — METOPROLOL TARTRATE 25 MG/1
25 TABLET, FILM COATED ORAL 2 TIMES DAILY
Status: DISCONTINUED | OUTPATIENT
Start: 2022-09-15 | End: 2022-09-17 | Stop reason: HOSPADM

## 2022-09-15 RX ORDER — LOSARTAN POTASSIUM 50 MG/1
50 TABLET ORAL DAILY
Status: DISCONTINUED | OUTPATIENT
Start: 2022-09-15 | End: 2022-09-17 | Stop reason: HOSPADM

## 2022-09-15 RX ORDER — FLUOXETINE HYDROCHLORIDE 20 MG/1
80 CAPSULE ORAL DAILY
Status: DISCONTINUED | OUTPATIENT
Start: 2022-09-15 | End: 2022-09-17 | Stop reason: HOSPADM

## 2022-09-15 RX ORDER — HYDROCODONE BITARTRATE AND ACETAMINOPHEN 10; 325 MG/1; MG/1
1 TABLET ORAL 2 TIMES DAILY PRN
Status: DISCONTINUED | OUTPATIENT
Start: 2022-09-15 | End: 2022-09-17 | Stop reason: HOSPADM

## 2022-09-15 RX ORDER — OXYBUTYNIN CHLORIDE 5 MG/1
5 TABLET ORAL DAILY
Status: DISCONTINUED | OUTPATIENT
Start: 2022-09-15 | End: 2022-09-17 | Stop reason: HOSPADM

## 2022-09-15 RX ORDER — FOLIC ACID 1 MG/1
1000 TABLET ORAL DAILY
Status: DISCONTINUED | OUTPATIENT
Start: 2022-09-15 | End: 2022-09-17 | Stop reason: HOSPADM

## 2022-09-15 RX ORDER — PREDNISONE 5 MG/1
5 TABLET ORAL DAILY
Status: DISCONTINUED | OUTPATIENT
Start: 2022-09-15 | End: 2022-09-17 | Stop reason: HOSPADM

## 2022-09-15 RX ORDER — IPRATROPIUM BROMIDE AND ALBUTEROL SULFATE 2.5; .5 MG/3ML; MG/3ML
3 SOLUTION RESPIRATORY (INHALATION)
Status: DISCONTINUED | OUTPATIENT
Start: 2022-09-15 | End: 2022-09-17 | Stop reason: HOSPADM

## 2022-09-15 RX ORDER — PRAVASTATIN SODIUM 40 MG/1
40 TABLET ORAL DAILY
Status: DISCONTINUED | OUTPATIENT
Start: 2022-09-15 | End: 2022-09-17 | Stop reason: HOSPADM

## 2022-09-15 RX ORDER — PANTOPRAZOLE SODIUM 40 MG/1
40 TABLET, DELAYED RELEASE ORAL DAILY
Status: DISCONTINUED | OUTPATIENT
Start: 2022-09-15 | End: 2022-09-17 | Stop reason: HOSPADM

## 2022-09-15 RX ORDER — PREDNISONE 5 MG/1
5 TABLET ORAL DAILY
COMMUNITY
Start: 2022-08-23 | End: 2023-03-18

## 2022-09-15 RX ORDER — AMLODIPINE BESYLATE 5 MG/1
5 TABLET ORAL DAILY
Status: DISCONTINUED | OUTPATIENT
Start: 2022-09-15 | End: 2022-09-17 | Stop reason: HOSPADM

## 2022-09-15 RX ORDER — ALPRAZOLAM 0.5 MG/1
2 TABLET ORAL 2 TIMES DAILY PRN
Status: DISCONTINUED | OUTPATIENT
Start: 2022-09-15 | End: 2022-09-17 | Stop reason: HOSPADM

## 2022-09-15 RX ORDER — MIRTAZAPINE 15 MG/1
30 TABLET, FILM COATED ORAL NIGHTLY
Status: DISCONTINUED | OUTPATIENT
Start: 2022-09-15 | End: 2022-09-17 | Stop reason: HOSPADM

## 2022-09-15 RX ADMIN — FUROSEMIDE 40 MG: 10 INJECTION, SOLUTION INTRAMUSCULAR; INTRAVENOUS at 06:09

## 2022-09-15 RX ADMIN — PANTOPRAZOLE SODIUM 40 MG: 40 TABLET, DELAYED RELEASE ORAL at 08:09

## 2022-09-15 RX ADMIN — AMLODIPINE BESYLATE 5 MG: 5 TABLET ORAL at 08:09

## 2022-09-15 RX ADMIN — IPRATROPIUM BROMIDE AND ALBUTEROL SULFATE 3 ML: 2.5; .5 SOLUTION RESPIRATORY (INHALATION) at 08:09

## 2022-09-15 RX ADMIN — ENOXAPARIN SODIUM 40 MG: 40 INJECTION SUBCUTANEOUS at 04:09

## 2022-09-15 RX ADMIN — ALPRAZOLAM 2 MG: 0.5 TABLET ORAL at 05:09

## 2022-09-15 RX ADMIN — FLUOXETINE 80 MG: 20 CAPSULE ORAL at 08:09

## 2022-09-15 RX ADMIN — IPRATROPIUM BROMIDE AND ALBUTEROL SULFATE 3 ML: 2.5; .5 SOLUTION RESPIRATORY (INHALATION) at 02:09

## 2022-09-15 RX ADMIN — FUROSEMIDE 40 MG: 10 INJECTION, SOLUTION INTRAMUSCULAR; INTRAVENOUS at 08:09

## 2022-09-15 RX ADMIN — FOLIC ACID 1000 MCG: 1 TABLET ORAL at 08:09

## 2022-09-15 RX ADMIN — PREDNISONE 5 MG: 5 TABLET ORAL at 08:09

## 2022-09-15 RX ADMIN — FUROSEMIDE 40 MG: 10 INJECTION, SOLUTION INTRAMUSCULAR; INTRAVENOUS at 02:09

## 2022-09-15 RX ADMIN — OXYBUTYNIN CHLORIDE 5 MG: 5 TABLET ORAL at 08:09

## 2022-09-15 RX ADMIN — PRAVASTATIN SODIUM 40 MG: 40 TABLET ORAL at 08:09

## 2022-09-15 RX ADMIN — METOPROLOL TARTRATE 25 MG: 25 TABLET, FILM COATED ORAL at 08:09

## 2022-09-15 RX ADMIN — POLYETHYLENE GLYCOL 3350 17 G: 17 POWDER, FOR SOLUTION ORAL at 08:09

## 2022-09-15 RX ADMIN — LOSARTAN POTASSIUM 50 MG: 50 TABLET, FILM COATED ORAL at 08:09

## 2022-09-15 RX ADMIN — MIRTAZAPINE 30 MG: 15 TABLET, FILM COATED ORAL at 08:09

## 2022-09-15 NOTE — PLAN OF CARE
09/15/22 0932   Discharge Assessment   Assessment Type Discharge Planning Assessment   Confirmed/corrected address, phone number and insurance Yes   Confirmed Demographics Correct on Facesheet   Source of Information patient   Lives With child(jenn), adult;spouse   Do you expect to return to your current living situation? Yes   Do you have help at home or someone to help you manage your care at home? Yes   Prior to hospitilization cognitive status: Alert/Oriented   Current cognitive status: Alert/Oriented   Walking or Climbing Stairs Difficulty ambulation difficulty, requires equipment   Mobility Management Cane, walker, WC   Dressing/Bathing Difficulty bathing difficulty, requires equipment   Dressing/Bathing Management Shower chair   Home Accessibility wheelchair accessible   Home Layout Able to live on 1st floor   Equipment Currently Used at Home oxygen;walker, rolling;wheelchair;cane, straight;shower chair;other (see comments)  (Trilogy, O2 via [employer])   Readmission within 30 days? No   Patient currently being followed by outpatient case management? No   Do you currently have service(s) that help you manage your care at home? No   Do you take prescription medications? Yes   Do you have prescription coverage? Yes   Do you have any problems affording any of your prescribed medications? Yes   If yes, what medications? Trelegy and Eliquis   Is the patient taking medications as prescribed? yes   How do you get to doctors appointments? car, drives self;family or friend will provide   Are you on dialysis? No   Do you take coumadin? No   Discharge Plan A Home Health   Discharge Plan B Home with family   DME Needed Upon Discharge  none   Discharge Plan discussed with: Patient   Discharge Barriers Identified None   Patient lives in single story home with spouse and 28 year old son who assist with patient's care as needed. Patient has oxygen at home through [employer]. Patient reports difficulty paying $160 a month for  Trelegy and Eliquis however Dr. Wang and Dr. Carrera are working on samples for patient. Discussed programs through website. Patient uses [facility] on [facility]. Patient reports having home health in the past but was not beneficial for patient. Patient denies any current needs at this time.

## 2022-09-15 NOTE — PROGRESS NOTES
"Inpatient Nutrition Evaluation    Admit Date: 9/14/2022   Total duration of encounter: 1 day    Nutrition Recommendation/Prescription     - Continue heart healthy diet.   - Monitor PO intake.     Nutrition Assessment     Chart Review    Reason Seen: malnutrition screening tool    Diagnosis:  Dyspnea, respiratory failure, hyperkalemia, heart failure    Relevant Medical History: A-Fib, COPD, and PE presenting to the ED for SOB     Nutrition-Related Medications: Folic acid, Lasix, MiraLax, Protonix, Prednisone    Nutrition-Related Labs:  9/15: BUN 30, Creat 1.3, GFR 45    Diet Order: Diet heart healthy  Oral Supplement Order: none at this time  Appetite/Oral Intake: fair/50-75% of meals  Factors Affecting Nutritional Intake: none identified at this time  Food/Anglican/Cultural Preferences: none reported       Wound(s): none noted    Comments    915:  Pt seen for unsure wt loss. Pt does report decreased appetite over the past week. States consuming ~ 1/2 meals. Offered ONS - pt declined, doesn't like milk products. When asked about UBW, pt states "I don't want to talk about that". Noted on lasix.    Anthropometrics    Height: 5' 0.98" (154.9 cm)    Last Weight: 90.7 kg (199 lb 15.3 oz) (09/15/22 1409) Weight Method: Bed Scale (bed scale broken)  BMI (Calculated): 37.8  BMI Classification: obese grade II (BMI 35-39.9)  Ideal Body Weight (IBW), Female: 104.9 lb  UBW: Pt does not want to talk about it.     Wt Readings from Last 5 Encounters:   09/15/22 90.7 kg (199 lb 15.3 oz)   11/07/19 107.2 kg (236 lb 5.3 oz)     Weight Change(s) Since Admission:  Admit Weight: 90.7 kg (200 lb) (09/14/22 2155)      Patient Education    Not applicable.    Monitoring & Evaluation     Dietitian will monitor food and beverage intake.  Nutrition Risk/Follow-Up: low (follow-up in 5-7 days)  Patients assigned 'low nutrition risk' status do not qualify for a full nutritional assessment but will be monitored and re-evaluated in a 5-7 day time " period.

## 2022-09-15 NOTE — PROGRESS NOTES
[facility]  Hospital Medicine Progress Note        Chief Complaint: SOB worse with movement    HPI:   62-year-old female with medical history notable for Good Pasture syndrome on chronic prednisone, chronic hypoxemic respiratory failure on 2-3 L nasal cannula, chronic HFpEF 55-60% (echo September 2021), COPD, remote history of DVT/PE more than 15 years ago, paroxysmal atrial fibrillation diagnosed September 2021, was initiated on Eliquis but stopped taking it due to cost.     Present to the ED with complaint dyspnea on exertion progressing over the past month to dyspnea at rest, orthopnea and PNDs and bilateral lower, extremity swelling.  Denies cough, chest pain, fever or chills.    She is on continuous home O2 (2-4L), but has been requiring more lately.  She has a history of PE approximately 3-4 months ago, but is no longer taking her blood thinners, because she cannot afford her medications.  She also has a history of CHF, which she states she found out about through her PCP 3 months ago, but it appears she was diagnosed with it here back in September 2021.  She states that she stopped taking her lasix  because it would make her urinate too often.      Chest x-ray show pulmonary congestion, EKG sinus rhythm, labs with MARCIAL and elevated BNP and hyperkalemia     Interval Hx:   No worsening shortness of breath reported.  No new complaints reported.    Breathing treatments are helping but not scheduled.    Patient states that she wanted to see if her medications were really needed that is why she was not taking them at home.    She is not interested in Coumadin.  Eliquis cost too much money, she cannot afford it.    Objective/physical exam:  General: Appears comfortable, no acute distress.  Integumentary: Warm, dry, intact.  Neuro:  Alert awake oriented.      Musculoskeletal: Purposeful movement noted.   Respiratory: No accessory muscle use. Breath sounds are equal.  Cardiovascular:  Regular rate. 1+ peripheral edema.    VITAL SIGNS: 24 HRS MIN & MAX LAST   Temp  Min: 97.9 °F (36.6 °C)  Max: 98.2 °F (36.8 °C) 97.9 °F (36.6 °C)   BP  Min: 98/66  Max: 138/88 98/66   Pulse  Min: 53  Max: 69  62   Resp  Min: 15  Max: 24 18   SpO2  Min: 89 %  Max: 98 % 97 %     CTA Chest Non-Coronary(PE Studies)  Narrative: EXAMINATION:  CTA CHEST NON CORONARY    CLINICAL HISTORY:  Pulmonary embolism (PE) suspected, high prob;    TECHNIQUE:  Low dose axial images, sagittal and coronal reformations were obtained from the thoracic inlet to the lung bases following the IV administration of contrast..  Contrast timing was optimized to evaluate the pulmonary arteries.  MIP images were performed.  Automated exposure control was utilized    COMPARISON:  None available    FINDINGS:  There is some chronic appearing interstitial lung changes seen bilaterally.  No pulmonary embolism is seen.  The pulmonary artery is dilated and enlarged consistent with pulmonary artery hypertension.  There is enlargement of the right atrium and reflux of contrast into the inferior vena cava consistent with right heart dysfunction.  No pleural effusion is seen.    There are multiple old left-sided rib fracture seen.  Thoracic aorta appears normal.    Upper abdomen appears grossly unremarkable.  Impression: No pulmonary embolus is seen but there is evidence of pulmonary artery hypertension and right heart failure    Multiple old left-sided rib fractures    Electronically signed by: Tiara Pennington  Date:    09/14/2022  Time:    18:22  X-Ray Chest 1 View  Narrative: EXAMINATION:  XR CHEST 1 VIEW    CLINICAL HISTORY:  Dyspnea, unspecified    TECHNIQUE:  One view    COMPARISON:  September 20, 2021.    FINDINGS:  Cardiopericardial silhouette enlarged appearance is similar.  Lungs ground-glass opacities reflect mild to moderate congestive process.  There is no focally dense consolidation.  Minimal right and small left pleural effusion.  No  pneumothorax.  Multiple old fractures of the left ribs.  Impression: Congestive changes.    Electronically signed by: Juan Miguel Jefferson  Date:    09/14/2022  Time:    14:21    Recent Labs   Lab 09/14/22  1328 09/15/22  0528   WBC 10.3 8.7   RBC 4.36 4.38   HGB 12.4 12.6   HCT 46.1 43.4   .7* 99.1*   MCH 28.4 28.8   MCHC 26.9* 29.0*   RDW 14.9 14.9    203   MPV 11.1* 10.9*       Recent Labs   Lab 09/14/22  1328 09/15/22  0528    140   K 5.8* 4.6   CO2 27 29   BUN 31.2* 30.0*   CREATININE 1.55* 1.33*   CALCIUM 9.3 9.0   MG  --  1.90   ALBUMIN 3.5 3.3*   ALKPHOS 98 91   ALT 11 11   AST 31 16   BILITOT 0.2 <0.5          Microbiology Results (last 7 days)       ** No results found for the last 168 hours. **             See below for Radiology    Scheduled Med:   amLODIPine  5 mg Oral Daily    enoxaparin  40 mg Subcutaneous Daily    FLUoxetine  80 mg Oral Daily    fluticasone-umeclidin-vilanter  1 puff Inhalation Daily    folic acid  1,000 mcg Oral Daily    furosemide (LASIX) injection  40 mg Intravenous Q8H    losartan  50 mg Oral Daily    metoprolol tartrate  25 mg Oral BID    mirtazapine  30 mg Oral QHS    oxybutynin  5 mg Oral Daily    pantoprazole  40 mg Oral Daily    polyethylene glycol  17 g Oral Daily    pravastatin  40 mg Oral Daily    predniSONE  5 mg Oral Daily           PRN Meds:  acetaminophen, albuterol-ipratropium, ALPRAZolam, aluminum-magnesium hydroxide-simethicone, glucagon (human recombinant), glucose, glucose, HYDROcodone-acetaminophen, melatonin, naloxone, ondansetron, prochlorperazine, simethicone     Nutrition Status:      Assessment/Plan:  Decompensated acute on chronic heart failure with preserved ejection fraction  Paroxysmal atrial fibrillation-currently NSR  Acute kidney injury superimposed on CKD 3A  COPD on 2 L home O2  Goodpasture syndrome  CKD III  Labial Cancer s/p removal  GERD  Anemia  Anxiety/Depression  History of DVT and pulmonary embolism    Plan  Continue treatment  for acute CHF exacerbation, IV Lasix therapy, beta-blocker, anticoagulation at appropriate management of blood pressure.    Continue supplemental oxygen as needed wean as tolerated.    Continue telemetry and rate control with Lopressor for atrial fibrillation.    Reviewed and restarted appropriate home medications.     This is a critical care document  Critical Care Diagnosis:  Acute decompensated heart failure.  Critical care interventions: hands on evaluation, review of labs/radiographs/records and discussions; assessing and managing the high probability of imminent or life-threatening deterioration of cardiorespiratory status.  Critical care time spent > 40 minutes.       Anticipated discharge and Disposition:  Pending.     All diagnosis and differential diagnosis have been reviewed; assessment and plan has been documented; I have personally reviewed the labs and test results that are presently available; I have reviewed the patients medication list; I have reviewed the consulting providers response and recommendations. I have reviewed or attempted to review medical records based upon their availability    All of the patient's questions have been  addressed and answered. Patient's is agreeable to the above stated plan.   I will continue to monitor closely and make adjustments to medical management as needed.      Roslyn Anand,    09/15/2022        This note was created with the assistance of Dragon voice recognition software. There may be transcription errors as a result of using this technology however minimal. Effort has been made to assure accuracy of transcription but any obvious errors or omissions should be clarified with the author of the document.

## 2022-09-15 NOTE — H&P
[facility] Hospital Medicine   History & Physical Note      Patient Name: Andreea Thompson  : 1960  MRN: [id number]  Patient Class: IP- Inpatient   Admission Date: 2022   Length of Stay: 1  Admitting Physician:  Service  Attending Physician: Ashleigh Fan MD  PCP: Jamaal Whitney MD  Source of history: Patient, patient's family, and EMR.   Face-to-Face encounter date: 2022  Code status: --Full      Chief Complaint   Dizziness and Shortness of Breath (SOB than usual x 2 days dizziness, COPD, on home oxygen at 2-3lpm via NC, PMH Afib, PE, wears trilogy at night, 89% at 2lpm , increased to 3 in triage, improved to 89-90%, per pt baseline at home)      History of Present Illness   Ms. Thompson is a 62 year old female with multiple comorbidities as outline below who presented to the ED with c/o worsening of her chronic SOB over the last month.  She is on continuous home O2 (2-4L), but has been requiring more lately.  She has a history of PE approximately 3-4 months ago, but is no longer taking her blood thinners, because she cannot afford her medications.  She also has a history of CHF, which she states she found out about through her PCP 3 months ago, but it appears she was diagnosed with it here back in 2021.  She states that she stopped taking her lasix since it would make her urinate too often.  Today's ED lab work revealed a D-dimer of 0.74, K+ 5.8, BUN/Cr 31.2/1.55, BNP 3117.  CXR showed congestive changes. CTA negative for PE, but shows evidence of pulmonary artery hypertension and right heart failure.  She was given a duoneb and lasix in the Ed and admitted to hospital medicine for management.       ROS   Except as documented, all other systems reviewed and negative     Past Medical History   Hypertension  Hyperlipidemia  HFpEF (EF 55-60% 2021)  COPD on 2 L home O2  Goodpasture syndrome  CKD III  Labial Cancer s/p  removal  GERD  Anemia  Anxiety/Depression  Hx: DVT RLE  Hx:  PE    Past Surgical History   ORIF left ankle  ORIF right wrist  ORIF left wrist  Labial removal  Lung biopsy  Hernia repair  Kidney biopsy  Tonsillectomy/Adenoidectomy    Social History     Social History     Tobacco Use    Smoking status: Former     Types: Cigarettes    Smokeless tobacco: Never   Substance Use Topics    Alcohol use: Never        Family History   Reviewed and negative    Allergies   Patient has no known allergies.    Home Medications     Prior to Admission medications    Not on File        Inpatient Medications   Scheduled Meds    Continuous Infusions    PRN Meds      Physical Exam   Vital Signs  Temp:  [98.1 °F (36.7 °C)]   Pulse:  [53-67]   Resp:  [15-24]   BP: (101-138)/(73-96)   SpO2:  [91 %-98 %]       General: Awake, alert, oriented, in no acute distress, non-toxic appearing. Appears comfortable  HEENT: NC/AT  Neck:  Supple  Chest: Mild expiratory wheeze, no accessory muscle use. O2 via NC in place   CVS: Regular rhythm. Normal S1/S2.  Abdomen: nondistended, normoactive BS, soft and non-tender.  MSK: No obvious deformity or joint swelling  Skin: Warm and dry  Neuro: AAOx3, no focal neurological deficit  Psych: Cooperative    Labs     Recent Labs     09/14/22  1328   WBC 10.3   RBC 4.36   HGB 12.4   HCT 46.1   .7*   MCH 28.4   MCHC 26.9*   RDW 14.9        No results for input(s): LACTIC in the last 72 hours.  Recent Labs     09/14/22  1328   D-DIMER 0.74*     No results for input(s): HGBA1C, CHOL, TRIG, LDL, VLDL, HDL in the last 72 hours.   Recent Labs     09/14/22  1328      K 5.8*   CHLORIDE 107   CO2 27   BUN 31.2*   CREATININE 1.55*   GLUCOSE 125*   CALCIUM 9.3   ALBUMIN 3.5   GLOBULIN 4.0*   ALKPHOS 98   ALT 11   AST 31   BILITOT 0.2     Recent Labs     09/14/22  1328   BNP 3,117.5*   TROPONINI 0.011          Microbiology Results (last 7 days)       ** No results found for the last 168 hours. **            Imaging     CTA Chest Non-Coronary(PE Studies)   Final Result      No pulmonary embolus is seen but there is evidence of pulmonary artery hypertension and right heart failure      Multiple old left-sided rib fractures         Electronically signed by: Tiara Pennington   Date:    09/14/2022   Time:    18:22      X-Ray Chest 1 View   Final Result      Congestive changes.         Electronically signed by: Juan Miguel Jefferson   Date:    09/14/2022   Time:    14:21        Assessment & Plan   CHF exacerbation  Atrial fibrillation not on anticoaculation  Elevated D-dimer  MARCIAL on CKD        Plan:  -Telemetry monitoring  -O2 supplementation to keep sats >92%  -Lasix 40 mg TID  -Duonebs q4 prn  -Echo pending  -US NIVA BLE pending  -Daily weights  -Resume home meds as appropriate once reconciled  -Labs in AM      VTE Prophylaxis: Juani Burnett, NP have reviewed and discussed this case with Dr. Fan.  Please see addendum for further assessment and plan from attending MD.

## 2022-09-16 PROCEDURE — 63600175 PHARM REV CODE 636 W HCPCS: Performed by: NURSE PRACTITIONER

## 2022-09-16 PROCEDURE — 25000003 PHARM REV CODE 250: Performed by: INTERNAL MEDICINE

## 2022-09-16 PROCEDURE — 21400001 HC TELEMETRY ROOM

## 2022-09-16 PROCEDURE — 99900035 HC TECH TIME PER 15 MIN (STAT)

## 2022-09-16 PROCEDURE — 94761 N-INVAS EAR/PLS OXIMETRY MLT: CPT

## 2022-09-16 PROCEDURE — 63600175 PHARM REV CODE 636 W HCPCS: Performed by: INTERNAL MEDICINE

## 2022-09-16 PROCEDURE — 94640 AIRWAY INHALATION TREATMENT: CPT

## 2022-09-16 PROCEDURE — 97162 PT EVAL MOD COMPLEX 30 MIN: CPT

## 2022-09-16 PROCEDURE — 27000221 HC OXYGEN, UP TO 24 HOURS

## 2022-09-16 PROCEDURE — 25000242 PHARM REV CODE 250 ALT 637 W/ HCPCS: Performed by: STUDENT IN AN ORGANIZED HEALTH CARE EDUCATION/TRAINING PROGRAM

## 2022-09-16 PROCEDURE — 97166 OT EVAL MOD COMPLEX 45 MIN: CPT

## 2022-09-16 RX ORDER — FUROSEMIDE 40 MG/1
40 TABLET ORAL DAILY
Status: DISCONTINUED | OUTPATIENT
Start: 2022-09-17 | End: 2022-09-17 | Stop reason: HOSPADM

## 2022-09-16 RX ORDER — ADHESIVE BANDAGE
30 BANDAGE TOPICAL DAILY PRN
Status: DISCONTINUED | OUTPATIENT
Start: 2022-09-16 | End: 2022-09-17 | Stop reason: HOSPADM

## 2022-09-16 RX ADMIN — ALPRAZOLAM 2 MG: 0.5 TABLET ORAL at 08:09

## 2022-09-16 RX ADMIN — FOLIC ACID 1000 MCG: 1 TABLET ORAL at 10:09

## 2022-09-16 RX ADMIN — MIRTAZAPINE 30 MG: 15 TABLET, FILM COATED ORAL at 08:09

## 2022-09-16 RX ADMIN — OXYBUTYNIN CHLORIDE 5 MG: 5 TABLET ORAL at 10:09

## 2022-09-16 RX ADMIN — FLUOXETINE 80 MG: 20 CAPSULE ORAL at 10:09

## 2022-09-16 RX ADMIN — PANTOPRAZOLE SODIUM 40 MG: 40 TABLET, DELAYED RELEASE ORAL at 10:09

## 2022-09-16 RX ADMIN — PREDNISONE 5 MG: 5 TABLET ORAL at 10:09

## 2022-09-16 RX ADMIN — METOPROLOL TARTRATE 25 MG: 25 TABLET, FILM COATED ORAL at 08:09

## 2022-09-16 RX ADMIN — IPRATROPIUM BROMIDE AND ALBUTEROL SULFATE 3 ML: 2.5; .5 SOLUTION RESPIRATORY (INHALATION) at 12:09

## 2022-09-16 RX ADMIN — IPRATROPIUM BROMIDE AND ALBUTEROL SULFATE 3 ML: 2.5; .5 SOLUTION RESPIRATORY (INHALATION) at 08:09

## 2022-09-16 RX ADMIN — PRAVASTATIN SODIUM 40 MG: 40 TABLET ORAL at 10:09

## 2022-09-16 RX ADMIN — METOPROLOL TARTRATE 25 MG: 25 TABLET, FILM COATED ORAL at 10:09

## 2022-09-16 RX ADMIN — HYDROCODONE BITARTRATE AND ACETAMINOPHEN 1 TABLET: 10; 325 TABLET ORAL at 11:09

## 2022-09-16 RX ADMIN — METOPROLOL TARTRATE 25 MG: 25 TABLET, FILM COATED ORAL at 09:09

## 2022-09-16 RX ADMIN — ENOXAPARIN SODIUM 40 MG: 40 INJECTION SUBCUTANEOUS at 04:09

## 2022-09-16 NOTE — PT/OT/SLP EVAL
Physical Therapy Evaluation    Patient Name:  Andreea Thompson   MRN:  [id number]    Recommendations:     Discharge Recommendations:  home health PT   Discharge Equipment Recommendations: none   Barriers to discharge: None    Assessment:     Andreea Thompson is a 62 y.o. female admitted with SOB. Patient reports living with spouse in single level home. She is independent and ambulates with RW.  She presents with the following impairments/functional limitations:  weakness, impaired endurance, impaired self care skills, impaired functional mobility, decreased safety awareness, impaired cardiopulmonary response to activity. She required MIN A for bed mobility. Ambulated 18 ft with RW & CGA. Desats with minimal activity, which she reports is her baseline. She will need HH PT at discharge. Progress patient as tolerated.     Rehab Prognosis: Good; patient would benefit from acute skilled PT services to address these deficits and reach maximum level of function.    Recent Surgery: * No surgery found *      Plan:     During this hospitalization, patient to be seen 5 x/week to address the identified rehab impairments via gait training, therapeutic activities, therapeutic exercises, neuromuscular re-education and progress toward the following goals:    Plan of Care Expires:  10/16/22    Subjective     Chief Complaint: none  Patient/Family Comments/goals: none  Pain/Comfort:  Pain Rating 1: 0/10    Patients cultural, spiritual, Judaism conflicts given the current situation: no    Living Environment:  Lives with spouse in single level home.   Prior to admission, patients level of function was independent.  Equipment used at home: oxygen, cane, straight, walker, rolling, shower chair, bedside commode, wheelchair.  DME owned (not currently used): none.  [facility]    Objective:     Communicated with nurse prior to session.  Patient found supine with oxygen, peripheral IV, PureWick   upon PT entry to room.    General Precautions: Standard, fall   Orthopedic Precautions:N/A   Braces: N/A  Respiratory Status: Nasal cannula, flow 3 L/min. Sats 89%-93% during session    Exams:  Cognitive Exam:  Patient is oriented to Person, Place, Time, and Situation  Sensation:    -       Intact  RLE ROM: WFL  RLE Strength: -4/5 grossly  LLE ROM: WFL  LLE Strength: -4/5 grossly    Functional Mobility:  Bed Mobility:     Supine to Sit: minimum assistance  Transfers:     Sit to Stand:  contact guard assistance with rolling walker  Gait: 18 ft with RW & CGA  Balance: fair      AM-PAC 6 CLICK MOBILITY  Total Score:17     Patient left up in chair with all lines intact and call button in reach.    GOALS:   Multidisciplinary Problems       Physical Therapy Goals          Problem: Physical Therapy    Goal Priority Disciplines Outcome Goal Variances Interventions   Physical Therapy Goal     PT, PT/OT Ongoing, Progressing     Description: Goals to be met by: 10/16/22     Patient will increase functional independence with mobility by performin. Supine to sit with Set-up [place]  2. Sit to supine with Set-up [place]  3. Sit to stand transfer with Supervision  4. Gait  x 200 feet with Supervision using Rolling Walker.                          History:     Past Medical History:   Diagnosis Date    COPD (chronic obstructive pulmonary disease)     Paroxysmal atrial fibrillation        No past surgical history on file.    Time Tracking:     PT Received On: 22  PT Start Time: 852     PT Stop Time: 910  PT Total Time (min): 18 min     Billable Minutes: Evaluation 18 minutes      2022

## 2022-09-16 NOTE — PLAN OF CARE
Problem: Physical Therapy  Goal: Physical Therapy Goal  Description: Goals to be met by: 10/16/22     Patient will increase functional independence with mobility by performin. Supine to sit with Set-up [place]  2. Sit to supine with Set-up [place]  3. Sit to stand transfer with Supervision  4. Gait  x 200 feet with Supervision using Rolling Walker.     Outcome: Ongoing, Progressing

## 2022-09-16 NOTE — PT/OT/SLP EVAL
Occupational Therapy   Evaluation and Discharge Note    Name: Andreea Thompson  MRN: [id number]  Admitting Diagnosis:  <principal problem not specified>   Recent Surgery: * No surgery found *      Recommendations:     Discharge Recommendations: home with home health  Discharge Equipment Recommendations:     Barriers to discharge:       Assessment:     Andreea Thompson is a 62 y.o. female with a medical diagnosis of acute R CHF. Pt's main limitation at this time appears to be SOB.  At this time, patient is functioning at their prior level of function and does not require further acute OT services.     Plan:     During this hospitalization, patient does not require further acute OT services.  Please re-consult if situation changes.    Plan of Care Reviewed with: patient    Subjective     Chief Complaint: SOB with activity, systemic pain   Patient/Family Comments/goals: Wants to return home.     Occupational Profile:  Living Environment: Lives with  and adult son in SS home. Best friend lives 4 houses down from her.   Previous level of function:  and son assist as needed and AE throughout home.   Roles and Routines: Retired nurse   Equipment Used at home:  oxygen, walker, rolling, cane, straight, shower chair, other (see comments), bedside commode (Trilogy, O2 via [employer]), electric scooter  Assistance upon Discharge: Pt reports , son she lives with, and her other child available to assist as needed.     Pain/Comfort:   Systemic Pain with onset of 10+ years    Patients cultural, spiritual, Tenriism conflicts given the current situation: no    Objective:     Communicated with: nsg and PT prior to session.  Patient found up in chair with oxygen, peripheral IV, PureWick upon OT entry to room.    General Precautions: Standard, fall   Orthopedic Precautions:    Braces:    Respiratory Status: Nasal cannula, flow 3 L/min     Occupational Performance:    Functional Mobility/Transfers:  Sit to stand from low  bedside chair with min/CGA for safety, performed @ chair 1x     Cognitive/Visual Perceptual:  Intact but Pt reports experiencing brain fog 2x during session today and reports of cognitive deficits 2/2 hypoxia prior to Good Pasture syndrome diagnosis 10+ years ago.     Physical Exam: B UE WFL     Treatment & Education:  Pt educated on POC.     Patient left up in chair with all lines intact and call button in reach    GOALS:   Multidisciplinary Problems       Occupational Therapy Goals       Not on file                    History:     Past Medical History:   Diagnosis Date    COPD (chronic obstructive pulmonary disease)     Paroxysmal atrial fibrillation        No past surgical history on file.    Time Tracking:     OT Date of Treatment:    OT Start Time: 0909  OT Stop Time: 0936  OT Total Time (min): 27 min    Billable Minutes:Evaluation Moderate Complexity - 27 min.     9/16/2022

## 2022-09-16 NOTE — PROGRESS NOTES
[facility]  Hospital Medicine Progress Note        Chief Complaint: SOB worse with movement    HPI:   62-year-old female with medical history notable for Good Pasture syndrome on chronic prednisone, chronic hypoxemic respiratory failure on 2-3 L nasal cannula, chronic HFpEF 55-60% (echo September 2021), COPD, remote history of DVT/PE more than 15 years ago, paroxysmal atrial fibrillation diagnosed September 2021, was initiated on Eliquis but stopped taking it due to cost.     Present to the ED with complaint dyspnea on exertion progressing over the past month to dyspnea at rest, orthopnea and PNDs and bilateral lower, extremity swelling.  Denies cough, chest pain, fever or chills.    She is on continuous home O2 (2-4L), but has been requiring more lately.  She has a history of PE approximately 3-4 months ago, but is no longer taking her blood thinners, because she cannot afford her medications.  She also has a history of CHF, which she states she found out about through her PCP 3 months ago, but it appears she was diagnosed with it here back in September 2021.  She states that she stopped taking her lasix  because it would make her urinate too often.      Chest x-ray show pulmonary congestion, EKG sinus rhythm, labs with MARCIAL and elevated BNP and hyperkalemia     Interval Hx:   No worsening shortness of breath reported.  No new complaints reported.    Patient is feeling much better today.    Patient states that she wanted to see if her medications were really needed that is why she was not taking them at home.    She is not interested in Coumadin.  Eliquis cost too much money, she cannot afford it.    Objective/physical exam:  General: Appears comfortable, no acute distress.  Integumentary: Warm, dry, intact.  Neuro:  Alert awake oriented.      Musculoskeletal: Purposeful movement noted.   Respiratory: No accessory muscle use. Breath sounds are equal.  Cardiovascular: Regular rate. 1+  peripheral edema.    VITAL SIGNS: 24 HRS MIN & MAX LAST   Temp  Min: 97.8 °F (36.6 °C)  Max: 98.5 °F (36.9 °C) 98.5 °F (36.9 °C)   BP  Min: 91/65  Max: 111/61 111/61   Pulse  Min: 51  Max: 63  63   Resp  Min: 18  Max: 22 (!) 22   SpO2  Min: 92 %  Max: 96 % (!) 93 %     Echo  · The left ventricle is normal in size with concentric remodeling and   normal systolic function.  · Severe right ventricular enlargement with moderately reduced right   ventricular systolic function.  · There is abnormal septal wall motion. There is systolic flattening of   the interventricular septum consistent with right ventricle pressure   overload.  · Severe right atrial enlargement.  · Interatrial septum bows to left, consider elevated right atrial   pressure.  · The estimated ejection fraction is 61%.  · Left ventricular diastolic dysfunction with elevated left atrial   pressure.  · There is severe pulmonary hypertension.  · The estimated PA systolic pressure is 65 mmHg.  · Elevated central venous pressure (15 mmHg).       Recent Labs   Lab 09/14/22  1328 09/15/22  0528   WBC 10.3 8.7   RBC 4.36 4.38   HGB 12.4 12.6   HCT 46.1 43.4   .7* 99.1*   MCH 28.4 28.8   MCHC 26.9* 29.0*   RDW 14.9 14.9    203   MPV 11.1* 10.9*       Recent Labs   Lab 09/14/22  1328 09/15/22  0528    140   K 5.8* 4.6   CO2 27 29   BUN 31.2* 30.0*   CREATININE 1.55* 1.33*   CALCIUM 9.3 9.0   MG  --  1.90   ALBUMIN 3.5 3.3*   ALKPHOS 98 91   ALT 11 11   AST 31 16   BILITOT 0.2 <0.5          Microbiology Results (last 7 days)       ** No results found for the last 168 hours. **             See below for Radiology    Scheduled Med:   albuterol-ipratropium  3 mL Nebulization Q6H WAKE    amLODIPine  5 mg Oral Daily    enoxaparin  40 mg Subcutaneous Daily    FLUoxetine  80 mg Oral Daily    folic acid  1,000 mcg Oral Daily    [START ON 9/17/2022] furosemide  40 mg Oral Daily    losartan  50 mg Oral Daily    metoprolol tartrate  25 mg Oral BID     mirtazapine  30 mg Oral QHS    oxybutynin  5 mg Oral Daily    pantoprazole  40 mg Oral Daily    polyethylene glycol  17 g Oral Daily    pravastatin  40 mg Oral Daily    predniSONE  5 mg Oral Daily           PRN Meds:  acetaminophen, albuterol-ipratropium, ALPRAZolam, aluminum-magnesium hydroxide-simethicone, glucagon (human recombinant), glucose, glucose, HYDROcodone-acetaminophen, magnesium hydroxide 400 mg/5 ml, melatonin, naloxone, ondansetron, prochlorperazine, simethicone     Nutrition Status:      Assessment/Plan:  Decompensated acute on chronic heart failure with preserved ejection fraction  Paroxysmal atrial fibrillation-currently NSR  Acute kidney injury superimposed on CKD 3A  COPD on 2 L home O2  Goodpasture syndrome  CKD III  Labial Cancer s/p removal  GERD  Anemia  Anxiety/Depression  History of DVT and pulmonary embolism    Plan  Continue treatment for acute CHF exacerbation, transition to oral Lasix 40 mg daily.    Continue supplemental oxygen as needed wean as tolerated.    Continue telemetry and rate control with Lopressor for atrial fibrillation.    Switch to Xarelto at discharge--patient received appropriate rx card.  Repeat labs in a.m. prior to discharge  Anticipated discharge and Disposition:  Pending.     All diagnosis and differential diagnosis have been reviewed; assessment and plan has been documented; I have personally reviewed the labs and test results that are presently available; I have reviewed the patients medication list; I have reviewed the consulting providers response and recommendations. I have reviewed or attempted to review medical records based upon their availability    All of the patient's questions have been  addressed and answered. Patient's is agreeable to the above stated plan.   I will continue to monitor closely and make adjustments to medical management as needed.      Roslyn Anand,    09/16/2022        This note was created with the assistance of Jerzy voice  recognition software. There may be transcription errors as a result of using this technology however minimal. Effort has been made to assure accuracy of transcription but any obvious errors or omissions should be clarified with the author of the document.

## 2022-09-17 VITALS
DIASTOLIC BLOOD PRESSURE: 73 MMHG | OXYGEN SATURATION: 91 % | WEIGHT: 205 LBS | SYSTOLIC BLOOD PRESSURE: 114 MMHG | RESPIRATION RATE: 20 BRPM | BODY MASS INDEX: 38.71 KG/M2 | HEIGHT: 61 IN | TEMPERATURE: 98 F | HEART RATE: 68 BPM

## 2022-09-17 PROBLEM — I50.9 CHF (CONGESTIVE HEART FAILURE): Status: ACTIVE | Noted: 2022-09-17

## 2022-09-17 LAB
ANION GAP SERPL CALC-SCNC: 8 MEQ/L
BUN SERPL-MCNC: 30.1 MG/DL (ref 9.8–20.1)
CALCIUM SERPL-MCNC: 9 MG/DL (ref 8.4–10.2)
CHLORIDE SERPL-SCNC: 100 MMOL/L (ref 98–107)
CO2 SERPL-SCNC: 36 MMOL/L (ref 23–31)
CREAT SERPL-MCNC: 1.46 MG/DL (ref 0.55–1.02)
CREAT/UREA NIT SERPL: 21
GFR SERPLBLD CREATININE-BSD FMLA CKD-EPI: 40 MLS/MIN/1.73/M2
GLUCOSE SERPL-MCNC: 104 MG/DL (ref 82–115)
MAGNESIUM SERPL-MCNC: 1.8 MG/DL (ref 1.6–2.6)
PHOSPHATE SERPL-MCNC: 4.1 MG/DL (ref 2.3–4.7)
POTASSIUM SERPL-SCNC: 4.2 MMOL/L (ref 3.5–5.1)
SODIUM SERPL-SCNC: 144 MMOL/L (ref 136–145)

## 2022-09-17 PROCEDURE — 63600175 PHARM REV CODE 636 W HCPCS: Performed by: INTERNAL MEDICINE

## 2022-09-17 PROCEDURE — 80048 BASIC METABOLIC PNL TOTAL CA: CPT | Performed by: STUDENT IN AN ORGANIZED HEALTH CARE EDUCATION/TRAINING PROGRAM

## 2022-09-17 PROCEDURE — 83735 ASSAY OF MAGNESIUM: CPT | Performed by: STUDENT IN AN ORGANIZED HEALTH CARE EDUCATION/TRAINING PROGRAM

## 2022-09-17 PROCEDURE — 94640 AIRWAY INHALATION TREATMENT: CPT

## 2022-09-17 PROCEDURE — 27000221 HC OXYGEN, UP TO 24 HOURS

## 2022-09-17 PROCEDURE — 25000242 PHARM REV CODE 250 ALT 637 W/ HCPCS: Performed by: STUDENT IN AN ORGANIZED HEALTH CARE EDUCATION/TRAINING PROGRAM

## 2022-09-17 PROCEDURE — 84100 ASSAY OF PHOSPHORUS: CPT | Performed by: STUDENT IN AN ORGANIZED HEALTH CARE EDUCATION/TRAINING PROGRAM

## 2022-09-17 PROCEDURE — 99900035 HC TECH TIME PER 15 MIN (STAT)

## 2022-09-17 PROCEDURE — 25000003 PHARM REV CODE 250: Performed by: INTERNAL MEDICINE

## 2022-09-17 PROCEDURE — 25000003 PHARM REV CODE 250: Performed by: STUDENT IN AN ORGANIZED HEALTH CARE EDUCATION/TRAINING PROGRAM

## 2022-09-17 PROCEDURE — 36415 COLL VENOUS BLD VENIPUNCTURE: CPT | Performed by: STUDENT IN AN ORGANIZED HEALTH CARE EDUCATION/TRAINING PROGRAM

## 2022-09-17 RX ADMIN — FUROSEMIDE 40 MG: 40 TABLET ORAL at 09:09

## 2022-09-17 RX ADMIN — LOSARTAN POTASSIUM 50 MG: 50 TABLET, FILM COATED ORAL at 09:09

## 2022-09-17 RX ADMIN — PRAVASTATIN SODIUM 40 MG: 40 TABLET ORAL at 09:09

## 2022-09-17 RX ADMIN — OXYBUTYNIN CHLORIDE 5 MG: 5 TABLET ORAL at 09:09

## 2022-09-17 RX ADMIN — PANTOPRAZOLE SODIUM 40 MG: 40 TABLET, DELAYED RELEASE ORAL at 09:09

## 2022-09-17 RX ADMIN — HYDROCODONE BITARTRATE AND ACETAMINOPHEN 1 TABLET: 10; 325 TABLET ORAL at 09:09

## 2022-09-17 RX ADMIN — FOLIC ACID 1000 MCG: 1 TABLET ORAL at 09:09

## 2022-09-17 RX ADMIN — PREDNISONE 5 MG: 5 TABLET ORAL at 09:09

## 2022-09-17 RX ADMIN — IPRATROPIUM BROMIDE AND ALBUTEROL SULFATE 3 ML: 2.5; .5 SOLUTION RESPIRATORY (INHALATION) at 09:09

## 2022-09-17 RX ADMIN — AMLODIPINE BESYLATE 5 MG: 5 TABLET ORAL at 09:09

## 2022-09-17 RX ADMIN — METOPROLOL TARTRATE 25 MG: 25 TABLET, FILM COATED ORAL at 09:09

## 2022-09-17 RX ADMIN — FLUOXETINE 80 MG: 20 CAPSULE ORAL at 09:09

## 2022-09-17 NOTE — DISCHARGE SUMMARY
[facility] Hospital Medicine Discharge Summary    Admit Date: 9/14/2022  Discharge Date and Time: 9/17/20228:49 AM  Admitting Physician:  Team  Discharging Physician: Rolsyn Anand DO.  Primary Care Physician: Jamaal Whitney MD    Discharge Diagnoses:  Decompensated acute on chronic heart failure with preserved ejection fraction  Paroxysmal atrial fibrillation-currently NSR  Acute kidney injury superimposed on CKD 3A  COPD on 2 L home O2  Goodpasture syndrome  CKD III  Labial Cancer s/p removal  GERD  Anemia  Anxiety/Depression  History of DVT and pulmonary embolism    Hospital Course:     62-year-old female with medical history notable for Good Pasture syndrome on chronic prednisone, chronic hypoxemic respiratory failure on 2-3 L nasal cannula, chronic HFpEF 55-60% (echo September 2021), COPD, remote history of DVT/PE more than 15 years ago, paroxysmal atrial fibrillation diagnosed September 2021, was initiated on Eliquis but stopped taking it due to cost.     Present to the ED with complaint dyspnea on exertion progressing over the past month to dyspnea at rest, orthopnea and PNDs and bilateral lower, extremity swelling.  Denies cough, chest pain, fever or chills.     She is on continuous home O2 (2-4L), but has been requiring more lately.  She has a history of PE approximately 3-4 months ago, but is no longer taking her blood thinners, because she cannot afford her medications.  She also has a history of CHF, which she states she found out about through her PCP 3 months ago, but it appears she was diagnosed with it here back in September 2021.  She states that she stopped taking her lasix  because it would make her urinate too often.    Patient states that she wanted to see if her medications were really needed that is why she was not taking them at home.      Chest x-ray show pulmonary congestion, EKG sinus rhythm, labs with MARCIAL and elevated BNP and hyperkalemia  Patient  "states that she wanted to see if her medications were really needed that is why she was not taking them at home.    She is not interested in Coumadin.  Eliquis cost too much money, she cannot afford it. She has been given a rx card for Xarelto, will need followup with her PCP appropriately for continued use and prescriptions.   Patient is strongly advised to take her medications as prescribed; she tells me now that she knows she "needs the medication" she will take it.   She is advised to return to ED or call 911 in case of an emergency and or if symptoms worsen.     Vitals:  VITAL SIGNS: 24 HRS MIN & MAX LAST   Temp  Min: 97.6 °F (36.4 °C)  Max: 98.5 °F (36.9 °C) 98 °F (36.7 °C)   BP  Min: 94/60  Max: 116/71 114/73   Pulse  Min: 59  Max: 74  67   Resp  Min: 18  Max: 22 20   SpO2  Min: 92 %  Max: 96 % (!) 94 %         Physical Exam:  Sitting up in bed eating breakfast.   Supplemental oxygen (home L in place)    General: Appears comfortable, no acute distress.  Integumentary: Warm, dry, intact.  Musculoskeletal: Purposeful movement noted.   Respiratory: No accessory muscle use. Breath sounds are equal.  Cardiovascular: Regular rate. No peripheral edema.      Procedures Performed: No admission procedures for hospital encounter.     Significant Diagnostic Studies: See Full reports for all details    Recent Labs   Lab 09/14/22  1328 09/15/22  0528   WBC 10.3 8.7   RBC 4.36 4.38   HGB 12.4 12.6   HCT 46.1 43.4   .7* 99.1*   MCH 28.4 28.8   MCHC 26.9* 29.0*   RDW 14.9 14.9    203   MPV 11.1* 10.9*       Recent Labs   Lab 09/14/22  1328 09/15/22  0528 09/17/22  0518    140 144   K 5.8* 4.6 4.2   CO2 27 29 36*   BUN 31.2* 30.0* 30.1*   CREATININE 1.55* 1.33* 1.46*   CALCIUM 9.3 9.0 9.0   MG  --  1.90 1.80   ALBUMIN 3.5 3.3*  --    ALKPHOS 98 91  --    ALT 11 11  --    AST 31 16  --    BILITOT 0.2 <0.5  --         Microbiology Results (last 7 days)       ** No results found for the last 168 hours. **      "        Echo  · The left ventricle is normal in size with concentric remodeling and   normal systolic function.  · Severe right ventricular enlargement with moderately reduced right   ventricular systolic function.  · There is abnormal septal wall motion. There is systolic flattening of   the interventricular septum consistent with right ventricle pressure   overload.  · Severe right atrial enlargement.  · Interatrial septum bows to left, consider elevated right atrial   pressure.  · The estimated ejection fraction is 61%.  · Left ventricular diastolic dysfunction with elevated left atrial   pressure.  · There is severe pulmonary hypertension.  · The estimated PA systolic pressure is 65 mmHg.  · Elevated central venous pressure (15 mmHg).            Medication List        START taking these medications      rivaroxaban 20 mg Tab  Commonly known as: XARELTO  Take 1 tablet (20 mg total) by mouth daily with dinner or evening meal.            CONTINUE taking these medications      acetaZOLAMIDE 250 MG tablet  Commonly known as: DIAMOX     albuterol 90 mcg/actuation inhaler  Commonly known as: PROVENTIL/VENTOLIN HFA     albuterol-ipratropium 2.5 mg-0.5 mg/3 mL nebulizer solution  Commonly known as: DUO-NEB     alendronate 70 MG tablet  Commonly known as: FOSAMAX     ALPRAZolam 2 MG Tab  Commonly known as: XANAX     amLODIPine 5 MG tablet  Commonly known as: NORVASC     cyanocobalamin 1,000 mcg/mL injection     FLUoxetine 40 MG capsule     folic acid 1 MG tablet  Commonly known as: FOLVITE     furosemide 20 MG tablet  Commonly known as: LASIX     HYDROcodone-acetaminophen  mg per tablet  Commonly known as: NORCO     losartan 50 MG tablet  Commonly known as: COZAAR     metoprolol tartrate 25 MG tablet  Commonly known as: LOPRESSOR     mirtazapine 30 MG tablet  Commonly known as: REMERON     oxybutynin 5 MG Tab  Commonly known as: DITROPAN     pantoprazole 40 MG tablet  Commonly known as: PROTONIX     pravastatin 40 MG  tablet  Commonly known as: PRAVACHOL     predniSONE 5 MG tablet  Commonly known as: DELTASONE     TRELEGY ELLIPTA 100-62.5-25 mcg Dsdv  Generic drug: fluticasone-umeclidin-vilanter            STOP taking these medications      ELIQUIS 5 mg Tab  Generic drug: apixaban               Where to Get Your Medications        These medications were sent to [id number] - [place], [place] - [address] AT [facility] [address]  [address], [place] [address]      Phone: [phone]   rivaroxaban 20 mg Tab          Explained in detail to the patient about the discharge plan, medications, and follow-up visits. Pt understands and agrees with the treatment plan  Discharge Disposition:    Discharged Condition: stable  Diet-   Dietary Orders (From admission, onward)       Start     Ordered    09/14/22 2120  Diet heart healthy  (Diet/Nutrition [facility])  Diet effective now         09/14/22 2122                   Medications Per DC med rec  Activities as tolerated   Follow-up Information       Jamaal Whitney MD Follow up.    Specialty: Family Medicine  Why: Someone from [facility] will call you with your follow up appointment date and time.  Contact information:  [address] [place] [address] rd  [facility] [id number]  [phone]                           For further questions contact hospitalist office    Discharge time 33 minutes    For worsening symptoms, chest pain, shortness of breath, increased abdominal pain, high grade fever, stroke or stroke like symptoms, immediately go to the nearest Emergency Room or call 911 as soon as possible.      Roslyn Jean Baptiste M.D, on 9/17/2022. at 8:49 AM.

## 2022-09-19 ENCOUNTER — PATIENT OUTREACH (OUTPATIENT)
Dept: ADMINISTRATIVE | Facility: CLINIC | Age: 62
End: 2022-09-19
Payer: MEDICARE

## 2022-09-19 NOTE — PROGRESS NOTES
C3 nurse spoke with Andreea Thompson for a TCC post hospital discharge follow up call. The patient does not have a scheduled HOSFU appointment with Jamaal Whitney MD   within 5-7 days post hospital discharge date 9/17/22. C3 nurse was unable to schedule HOSFU appointment in [facility].  Patient states she will call to schedule appt.

## 2022-09-23 DIAGNOSIS — R25.1 TREMOR: Primary | ICD-10-CM

## 2022-09-23 NOTE — PHYSICIAN QUERY
PT Name: Andreea Thompson  MR #: [id number]     DOCUMENTATION CLARIFICATION     CDS/: Adri Graf RN, CCDS              Contact information: [email or web address]  This form is a permanent document in the medical record.     Query Date: September 23, 2022    By submitting this query, we are merely seeking further clarification of documentation.  Please utilize your independent clinical judgment when addressing the question(s) below.  The Medical Record contains the following   Indicators   Supporting Clinical Findings Location in Medical Record   X SOB, LITTLEJOHN, Wheezing, Productive Cough, Use of Accessory Muscles, etc. presenting to the ED for SOB which is chronic but has been gradually worsening for about a month 9/14 ed note   X RR         ABGs         O2 sat Sats 89% on 2 L NC, increased to 3 in triage- 89-90% 9/14 ed note    Hypoxia/Hypercapnia      BiPAP/Intubation/Mechanical Ventilation     X Supplemental O2 Sats 91-94% on 3L NC 9/16-9/17 vs    X Home O2, Oxygen Dependence   She is on continuous home O2 (2-4L),  9/15 prog note   X Respiratory distress or failure Acute on chronic respiratory failure with hypoxemia      chronic hypoxemic respiratory failure on 2-3 L nasal cannula 9/14 ed note      9/14 h/p, 9/15-9/16 prog notes, 9/17 d/c summary    Radiology findings     X Acute/Chronic Illness Decompensated acute on chronic heart failure with preserved ejection fraction  MARCIAL, PAF, COPD 9/14 h/p    Treatment      Other         The noted clinical guidelines are only system guidelines and do not replace the providers clinical judgment.    Provider, please clarify if chronic vs acute on chronic respiratory failure.   [    ] Acute and (on) Chronic Respiratory Failure with Hypoxia - pO2 >10 mmHg below baseline or SpO2 < 91% on usual home O2 or O2 = 2L/min over baseline home O2 and respiratory symptoms documented   [  x  ] Chronic Respiratory Failure with Hypoxia - Continuous home oxygen use   [    ] Other  Respiratory Diagnosis (please specify): _________________   [   ] Clinically Undetermined       Please document in your progress notes daily for the duration of treatment until resolved and include in your discharge summary.     Reference:    RADHA Fitzpatrick MD. (2020, March 13). Acute respiratory distress syndrome: Clinical features, diagnosis, and complications in adults ([id number] JANETTE Aguilar MD & [id number] OZ Hill MD, Eds.). Retrieved November 13, 2020, from [email or web address]-respiratory-distress-syndrome-clinical-features-diagnosis-and-complications-in-adults?search=ards&source=search_result&selectedTitle=1~150&usage_type=default&display_rank=1  Form No. [id number]

## 2022-10-12 ENCOUNTER — DOCUMENT SCAN (OUTPATIENT)
Dept: HOME HEALTH SERVICES | Facility: HOSPITAL | Age: 62
End: 2022-10-12
Payer: MEDICARE

## 2022-10-13 ENCOUNTER — DOCUMENT SCAN (OUTPATIENT)
Dept: HOME HEALTH SERVICES | Facility: HOSPITAL | Age: 62
End: 2022-10-13
Payer: MEDICARE

## 2022-10-26 ENCOUNTER — OFFICE VISIT (OUTPATIENT)
Dept: NEUROLOGY | Facility: CLINIC | Age: 62
End: 2022-10-26
Payer: MEDICARE

## 2022-10-26 VITALS
SYSTOLIC BLOOD PRESSURE: 140 MMHG | WEIGHT: 205 LBS | HEIGHT: 61 IN | HEART RATE: 80 BPM | DIASTOLIC BLOOD PRESSURE: 115 MMHG | BODY MASS INDEX: 38.71 KG/M2

## 2022-10-26 DIAGNOSIS — R25.1 TREMOR: ICD-10-CM

## 2022-10-26 DIAGNOSIS — G25.0 ESSENTIAL TREMOR: Primary | ICD-10-CM

## 2022-10-26 PROCEDURE — 99999 PR PBB SHADOW E&M-EST. PATIENT-LVL IV: CPT | Mod: PBBFAC,,, | Performed by: PSYCHIATRY & NEUROLOGY

## 2022-10-26 PROCEDURE — 99204 PR OFFICE/OUTPT VISIT, NEW, LEVL IV, 45-59 MIN: ICD-10-PCS | Mod: S$PBB,,, | Performed by: PSYCHIATRY & NEUROLOGY

## 2022-10-26 PROCEDURE — 99999 PR PBB SHADOW E&M-EST. PATIENT-LVL IV: ICD-10-PCS | Mod: PBBFAC,,, | Performed by: PSYCHIATRY & NEUROLOGY

## 2022-10-26 PROCEDURE — 99204 OFFICE O/P NEW MOD 45 MIN: CPT | Mod: S$PBB,,, | Performed by: PSYCHIATRY & NEUROLOGY

## 2022-10-26 PROCEDURE — 99214 OFFICE O/P EST MOD 30 MIN: CPT | Mod: PBBFAC | Performed by: PSYCHIATRY & NEUROLOGY

## 2022-10-26 RX ORDER — APIXABAN 2.5 MG/1
2.5 TABLET, FILM COATED ORAL 2 TIMES DAILY
COMMUNITY
Start: 2022-07-05 | End: 2022-10-26

## 2022-10-26 RX ORDER — PRIMIDONE 50 MG/1
50 TABLET ORAL NIGHTLY
Qty: 30 TABLET | Refills: 11 | Status: SHIPPED | OUTPATIENT
Start: 2022-10-26 | End: 2024-03-25

## 2022-10-26 NOTE — PROGRESS NOTES
Subjective:       Patient ID: Andreea Thompson is a 62 y.o. female.    Chief Complaint: Establish Care (New Pt referred by Dr Jamaal Whitney for neuro consult) and Tremors (Onset ~7-8yrs/C/o tremor BUE/ sometimes whole body; intermittent shuffling gait; has fallen when gets tripped up by her tubing; handwriting changes; headaches; muscle weakness; fatigue)    HPI    4 ext tremors progressive over 8 years  Interferes with eating/writing  Constant  Rest and intention component  No rigidity  On oxygen 24/7 2.2 goodpasture syndrome  Uses trilogy at night  No meds for tremor tried  No known fam hx as she was an orphan  Review of Systems    Retired nurse  The remainder of the 14 system ROS is noncontributory or negative unless mentioned/reviewed above.    Objective:      Physical Exam  4 ext postural tremor with rest component  No rigitiy/bradykinesia  In wc 2.2 appt, she can walk  Mental Status: Alert and oriented x3. Language is fluent with good comprehension.    Cranial Nerve: Pupils are equal, round, and reactive to light. Visual fields are intact to confrontation. Normal fundi. Ocular movements are intact. Face is symmetric at rest and with activation with intact sensation throughout. Hearing intact to finger rub bilaterally. Muscles of tongue and palate activate symmetrically. No dysarthria. Strength is full in sternocleidomastoid and trapezius bilaterally.    Motor: Muscle bulk and tone are normal. Strength is 5/5 in all four extremities both proximally and distally. Intact fine motor movements bilaterally. There is no pronator drift or satelliting on arm roll.    Sensory: Sensation is intact to light touch, pinprick, vibration, and proprioception throughout. Romberg is negative.    Reflexes: 2+ and symmetric at the biceps, triceps, brachioradialis, patella, and Achilles bilaterally. Plantar response is flexor bilaterally.    Coordination: No dysmetria on finger-nose-finger or heel-knee-shin. Normal rapid alternating  movements. Fast finger tapping with normal amplitude and speed.    Gait: Narrow based with normal stride length and good arm swing bilaterally. Able to walk on heels, toes, and in tandem.    Assessment:       Problem List Items Addressed This Visit    None  Visit Diagnoses       Essential tremor    -  Primary    Tremor                  Plan:       ET  Trial of primidone

## 2022-11-09 ENCOUNTER — DOCUMENT SCAN (OUTPATIENT)
Dept: HOME HEALTH SERVICES | Facility: HOSPITAL | Age: 62
End: 2022-11-09
Payer: MEDICARE

## 2022-11-16 ENCOUNTER — LAB REQUISITION (OUTPATIENT)
Dept: LAB | Facility: HOSPITAL | Age: 62
End: 2022-11-16
Payer: MEDICARE

## 2022-11-16 DIAGNOSIS — I13.0 HYPERTENSIVE HEART AND CHRONIC KIDNEY DISEASE WITH HEART FAILURE AND STAGE 1 THROUGH STAGE 4 CHRONIC KIDNEY DISEASE, OR UNSPECIFIED CHRONIC KIDNEY DISEASE: ICD-10-CM

## 2022-11-16 LAB
ANION GAP SERPL CALC-SCNC: 8 MEQ/L
BUN SERPL-MCNC: 21.3 MG/DL (ref 9.8–20.1)
CALCIUM SERPL-MCNC: 9.4 MG/DL (ref 8.4–10.2)
CHLORIDE SERPL-SCNC: 108 MMOL/L (ref 98–107)
CO2 SERPL-SCNC: 27 MMOL/L (ref 23–31)
CREAT SERPL-MCNC: 1.43 MG/DL (ref 0.55–1.02)
CREAT/UREA NIT SERPL: 15
GFR SERPLBLD CREATININE-BSD FMLA CKD-EPI: 41 MLS/MIN/1.73/M2
GLUCOSE SERPL-MCNC: 104 MG/DL (ref 82–115)
POTASSIUM SERPL-SCNC: 5.3 MMOL/L (ref 3.5–5.1)
SODIUM SERPL-SCNC: 143 MMOL/L (ref 136–145)

## 2022-11-16 PROCEDURE — 80048 BASIC METABOLIC PNL TOTAL CA: CPT | Performed by: INTERNAL MEDICINE

## 2022-11-18 ENCOUNTER — DOCUMENT SCAN (OUTPATIENT)
Dept: HOME HEALTH SERVICES | Facility: HOSPITAL | Age: 62
End: 2022-11-18
Payer: MEDICARE

## 2023-03-17 ENCOUNTER — HOSPITAL ENCOUNTER (OUTPATIENT)
Facility: HOSPITAL | Age: 63
Discharge: HOME | End: 2023-03-18
Attending: EMERGENCY MEDICINE | Admitting: INTERNAL MEDICINE
Payer: MEDICARE

## 2023-03-17 DIAGNOSIS — R10.9 ABDOMINAL PAIN: ICD-10-CM

## 2023-03-17 DIAGNOSIS — Z79.01 ANTICOAGULATED: ICD-10-CM

## 2023-03-17 DIAGNOSIS — R06.02 SHORTNESS OF BREATH: Primary | ICD-10-CM

## 2023-03-17 DIAGNOSIS — K92.2 LOWER GI BLEEDING: ICD-10-CM

## 2023-03-17 LAB
ALBUMIN SERPL-MCNC: 3.8 G/DL (ref 3.4–4.8)
ALBUMIN/GLOB SERPL: 1 RATIO (ref 1.1–2)
ALP SERPL-CCNC: 143 UNIT/L (ref 40–150)
ALT SERPL-CCNC: 30 UNIT/L (ref 0–55)
APTT PPP: 25.8 SECONDS (ref 23.2–33.7)
AST SERPL-CCNC: 30 UNIT/L (ref 5–34)
BASOPHILS # BLD AUTO: 0.13 X10(3)/MCL (ref 0–0.2)
BASOPHILS NFR BLD AUTO: 1 %
BILIRUBIN DIRECT+TOT PNL SERPL-MCNC: 0.3 MG/DL
BUN SERPL-MCNC: 18 MG/DL (ref 9.8–20.1)
CALCIUM SERPL-MCNC: 9.8 MG/DL (ref 8.4–10.2)
CHLORIDE SERPL-SCNC: 105 MMOL/L (ref 98–107)
CO2 SERPL-SCNC: 25 MMOL/L (ref 23–31)
CREAT SERPL-MCNC: 1.22 MG/DL (ref 0.55–1.02)
EOSINOPHIL # BLD AUTO: 0.52 X10(3)/MCL (ref 0–0.9)
EOSINOPHIL NFR BLD AUTO: 4 %
ERYTHROCYTE [DISTWIDTH] IN BLOOD BY AUTOMATED COUNT: 16.3 % (ref 11.5–17)
GFR SERPLBLD CREATININE-BSD FMLA CKD-EPI: 50 MLS/MIN/1.73/M2
GLOBULIN SER-MCNC: 3.7 GM/DL (ref 2.4–3.5)
GLUCOSE SERPL-MCNC: 92 MG/DL (ref 82–115)
HCT VFR BLD AUTO: 46 % (ref 37–47)
HGB BLD-MCNC: 14.1 G/DL (ref 12–16)
IMM GRANULOCYTES # BLD AUTO: 0.25 X10(3)/MCL (ref 0–0.04)
IMM GRANULOCYTES NFR BLD AUTO: 1.9 %
INR BLD: 0.96 (ref 0–1.3)
LYMPHOCYTES # BLD AUTO: 3.47 X10(3)/MCL (ref 0.6–4.6)
LYMPHOCYTES NFR BLD AUTO: 27 %
MCH RBC QN AUTO: 28.5 PG
MCHC RBC AUTO-ENTMCNC: 30.7 G/DL (ref 33–36)
MCV RBC AUTO: 93.1 FL (ref 80–94)
MONOCYTES # BLD AUTO: 0.67 X10(3)/MCL (ref 0.1–1.3)
MONOCYTES NFR BLD AUTO: 5.2 %
NEUTROPHILS # BLD AUTO: 7.83 X10(3)/MCL (ref 2.1–9.2)
NEUTROPHILS NFR BLD AUTO: 60.9 %
NRBC BLD AUTO-RTO: 0 %
PLATELET # BLD AUTO: 290 X10(3)/MCL (ref 130–400)
PMV BLD AUTO: 10.4 FL (ref 7.4–10.4)
POTASSIUM SERPL-SCNC: 5.4 MMOL/L (ref 3.5–5.1)
PROT SERPL-MCNC: 7.5 GM/DL (ref 5.8–7.6)
PROTHROMBIN TIME: 12.7 SECONDS (ref 12.5–14.5)
RBC # BLD AUTO: 4.94 X10(6)/MCL (ref 4.2–5.4)
SODIUM SERPL-SCNC: 138 MMOL/L (ref 136–145)
WBC # SPEC AUTO: 12.9 X10(3)/MCL (ref 4.5–11.5)

## 2023-03-17 PROCEDURE — 85610 PROTHROMBIN TIME: CPT | Performed by: NURSE PRACTITIONER

## 2023-03-17 PROCEDURE — 93010 EKG 12-LEAD: ICD-10-PCS | Mod: ,,, | Performed by: STUDENT IN AN ORGANIZED HEALTH CARE EDUCATION/TRAINING PROGRAM

## 2023-03-17 PROCEDURE — 93005 ELECTROCARDIOGRAM TRACING: CPT

## 2023-03-17 PROCEDURE — 99285 EMERGENCY DEPT VISIT HI MDM: CPT | Mod: 25

## 2023-03-17 PROCEDURE — 80053 COMPREHEN METABOLIC PANEL: CPT | Performed by: NURSE PRACTITIONER

## 2023-03-17 PROCEDURE — 96374 THER/PROPH/DIAG INJ IV PUSH: CPT

## 2023-03-17 PROCEDURE — 85730 THROMBOPLASTIN TIME PARTIAL: CPT | Performed by: NURSE PRACTITIONER

## 2023-03-17 PROCEDURE — 85025 COMPLETE CBC W/AUTO DIFF WBC: CPT | Performed by: NURSE PRACTITIONER

## 2023-03-17 PROCEDURE — G0378 HOSPITAL OBSERVATION PER HR: HCPCS

## 2023-03-17 PROCEDURE — 63600175 PHARM REV CODE 636 W HCPCS: Performed by: EMERGENCY MEDICINE

## 2023-03-17 PROCEDURE — 93010 ELECTROCARDIOGRAM REPORT: CPT | Mod: ,,, | Performed by: STUDENT IN AN ORGANIZED HEALTH CARE EDUCATION/TRAINING PROGRAM

## 2023-03-17 PROCEDURE — 93010 ELECTROCARDIOGRAM REPORT: CPT | Mod: 76,,, | Performed by: STUDENT IN AN ORGANIZED HEALTH CARE EDUCATION/TRAINING PROGRAM

## 2023-03-17 RX ORDER — DROPERIDOL 2.5 MG/ML
1.25 INJECTION, SOLUTION INTRAMUSCULAR; INTRAVENOUS
Status: COMPLETED | OUTPATIENT
Start: 2023-03-17 | End: 2023-03-17

## 2023-03-17 RX ADMIN — DROPERIDOL 1.25 MG: 2.5 INJECTION, SOLUTION INTRAMUSCULAR; INTRAVENOUS at 10:03

## 2023-03-17 NOTE — FIRST PROVIDER EVALUATION
Medical screening examination initiated.  I have conducted a focused provider triage encounter, findings are as follows:    Brief history of present illness:  62-year-old female presents to ER complaining of rectal bleeding x4 days.  Reports blood is bright red and noted when wiping.  Denies any chest pain.  Reports is chronically short of breath and on oxygen.  Reports abdominal pain and has a history of umbilical hernia that is reducible.  Patient states Eliquis daily.    Vitals:    03/17/23 1634   BP: (!) 146/81   Pulse: 71   Resp: 18   Temp: 97.3 °F (36.3 °C)   TempSrc: Temporal   SpO2: 96%   Weight: 86.2 kg (190 lb)       Pertinent physical exam:  Awake and alert    Brief workup plan:  Labs and x-ray.    Preliminary workup initiated; this workup will be continued and followed by the physician or advanced practice provider that is assigned to the patient when roomed.

## 2023-03-17 NOTE — Clinical Note
Diagnosis: Lower GI bleeding [447087]   Future Attending Provider: SHWETA REYES [[id number]]   Admitting Provider:: SHWETA REYES [[id number]]   Special Needs:: Fall Risk [15]

## 2023-03-18 VITALS
TEMPERATURE: 98 F | SYSTOLIC BLOOD PRESSURE: 129 MMHG | BODY MASS INDEX: 36.63 KG/M2 | OXYGEN SATURATION: 95 % | WEIGHT: 194 LBS | HEART RATE: 80 BPM | RESPIRATION RATE: 18 BRPM | DIASTOLIC BLOOD PRESSURE: 85 MMHG | HEIGHT: 61 IN

## 2023-03-18 PROBLEM — K92.2 LOWER GI BLEEDING: Status: ACTIVE | Noted: 2023-03-18

## 2023-03-18 LAB
ALBUMIN SERPL-MCNC: 3.5 G/DL (ref 3.4–4.8)
ALBUMIN/GLOB SERPL: 1 RATIO (ref 1.1–2)
ALP SERPL-CCNC: 128 UNIT/L (ref 40–150)
ALT SERPL-CCNC: 25 UNIT/L (ref 0–55)
AST SERPL-CCNC: 24 UNIT/L (ref 5–34)
BASOPHILS # BLD AUTO: 0.07 X10(3)/MCL (ref 0–0.2)
BASOPHILS NFR BLD AUTO: 0.6 %
BILIRUBIN DIRECT+TOT PNL SERPL-MCNC: 0.3 MG/DL
BUN SERPL-MCNC: 16.2 MG/DL (ref 9.8–20.1)
CALCIUM SERPL-MCNC: 9.5 MG/DL (ref 8.4–10.2)
CHLORIDE SERPL-SCNC: 106 MMOL/L (ref 98–107)
CO2 SERPL-SCNC: 22 MMOL/L (ref 23–31)
CREAT SERPL-MCNC: 1.08 MG/DL (ref 0.55–1.02)
EOSINOPHIL # BLD AUTO: 0.24 X10(3)/MCL (ref 0–0.9)
EOSINOPHIL NFR BLD AUTO: 2.1 %
ERYTHROCYTE [DISTWIDTH] IN BLOOD BY AUTOMATED COUNT: 16.5 % (ref 11.5–17)
GFR SERPLBLD CREATININE-BSD FMLA CKD-EPI: 58 MLS/MIN/1.73/M2
GLOBULIN SER-MCNC: 3.4 GM/DL (ref 2.4–3.5)
GLUCOSE SERPL-MCNC: 117 MG/DL (ref 82–115)
HCT VFR BLD AUTO: 44.1 % (ref 37–47)
HGB BLD-MCNC: 13.2 G/DL (ref 12–16)
IMM GRANULOCYTES # BLD AUTO: 0.2 X10(3)/MCL (ref 0–0.04)
IMM GRANULOCYTES NFR BLD AUTO: 1.7 %
LYMPHOCYTES # BLD AUTO: 0.92 X10(3)/MCL (ref 0.6–4.6)
LYMPHOCYTES NFR BLD AUTO: 8 %
MCH RBC QN AUTO: 28.2 PG
MCHC RBC AUTO-ENTMCNC: 29.9 G/DL (ref 33–36)
MCV RBC AUTO: 94.2 FL (ref 80–94)
MONOCYTES # BLD AUTO: 0.18 X10(3)/MCL (ref 0.1–1.3)
MONOCYTES NFR BLD AUTO: 1.6 %
NEUTROPHILS # BLD AUTO: 9.82 X10(3)/MCL (ref 2.1–9.2)
NEUTROPHILS NFR BLD AUTO: 86 %
NRBC BLD AUTO-RTO: 0 %
PLATELET # BLD AUTO: 234 X10(3)/MCL (ref 130–400)
PMV BLD AUTO: 9.8 FL (ref 7.4–10.4)
POTASSIUM SERPL-SCNC: 4.8 MMOL/L (ref 3.5–5.1)
PROT SERPL-MCNC: 6.9 GM/DL (ref 5.8–7.6)
RBC # BLD AUTO: 4.68 X10(6)/MCL (ref 4.2–5.4)
SODIUM SERPL-SCNC: 137 MMOL/L (ref 136–145)
WBC # SPEC AUTO: 11.4 X10(3)/MCL (ref 4.5–11.5)

## 2023-03-18 PROCEDURE — 94761 N-INVAS EAR/PLS OXIMETRY MLT: CPT

## 2023-03-18 PROCEDURE — 96374 THER/PROPH/DIAG INJ IV PUSH: CPT | Mod: 59

## 2023-03-18 PROCEDURE — 25000003 PHARM REV CODE 250: Performed by: NURSE PRACTITIONER

## 2023-03-18 PROCEDURE — 63600175 PHARM REV CODE 636 W HCPCS: Performed by: INTERNAL MEDICINE

## 2023-03-18 PROCEDURE — 99900035 HC TECH TIME PER 15 MIN (STAT)

## 2023-03-18 PROCEDURE — G0378 HOSPITAL OBSERVATION PER HR: HCPCS

## 2023-03-18 PROCEDURE — 94640 AIRWAY INHALATION TREATMENT: CPT

## 2023-03-18 PROCEDURE — 25000003 PHARM REV CODE 250: Performed by: INTERNAL MEDICINE

## 2023-03-18 PROCEDURE — 85025 COMPLETE CBC W/AUTO DIFF WBC: CPT | Performed by: INTERNAL MEDICINE

## 2023-03-18 PROCEDURE — 25000242 PHARM REV CODE 250 ALT 637 W/ HCPCS: Performed by: PHYSICIAN ASSISTANT

## 2023-03-18 PROCEDURE — 80053 COMPREHEN METABOLIC PANEL: CPT | Performed by: INTERNAL MEDICINE

## 2023-03-18 PROCEDURE — 27000221 HC OXYGEN, UP TO 24 HOURS

## 2023-03-18 RX ORDER — TALC
6 POWDER (GRAM) TOPICAL NIGHTLY PRN
Status: DISCONTINUED | OUTPATIENT
Start: 2023-03-18 | End: 2023-03-18 | Stop reason: HOSPADM

## 2023-03-18 RX ORDER — AMLODIPINE BESYLATE 5 MG/1
5 TABLET ORAL DAILY
Status: DISCONTINUED | OUTPATIENT
Start: 2023-03-18 | End: 2023-03-18 | Stop reason: HOSPADM

## 2023-03-18 RX ORDER — IPRATROPIUM BROMIDE AND ALBUTEROL SULFATE 2.5; .5 MG/3ML; MG/3ML
3 SOLUTION RESPIRATORY (INHALATION)
Status: DISCONTINUED | OUTPATIENT
Start: 2023-03-18 | End: 2023-03-18

## 2023-03-18 RX ORDER — ALPRAZOLAM 0.5 MG/1
2 TABLET ORAL 2 TIMES DAILY PRN
Status: DISCONTINUED | OUTPATIENT
Start: 2023-03-18 | End: 2023-03-18 | Stop reason: HOSPADM

## 2023-03-18 RX ORDER — HYDROCORTISONE ACETATE 25 MG/1
25 SUPPOSITORY RECTAL 2 TIMES DAILY
Qty: 20 SUPPOSITORY | Refills: 0 | Status: SHIPPED | OUTPATIENT
Start: 2023-03-18 | End: 2023-03-28

## 2023-03-18 RX ORDER — ALBUTEROL SULFATE 0.83 MG/ML
2.5 SOLUTION RESPIRATORY (INHALATION)
Status: DISCONTINUED | OUTPATIENT
Start: 2023-03-18 | End: 2023-03-18 | Stop reason: HOSPADM

## 2023-03-18 RX ORDER — HYDROCODONE BITARTRATE AND ACETAMINOPHEN 10; 325 MG/1; MG/1
1 TABLET ORAL 2 TIMES DAILY PRN
Status: DISCONTINUED | OUTPATIENT
Start: 2023-03-18 | End: 2023-03-18 | Stop reason: HOSPADM

## 2023-03-18 RX ORDER — FOLIC ACID 1 MG/1
1000 TABLET ORAL DAILY
Status: DISCONTINUED | OUTPATIENT
Start: 2023-03-18 | End: 2023-03-18 | Stop reason: HOSPADM

## 2023-03-18 RX ORDER — ALBUTEROL SULFATE 0.83 MG/ML
2.5 SOLUTION RESPIRATORY (INHALATION) EVERY 4 HOURS PRN
Status: DISCONTINUED | OUTPATIENT
Start: 2023-03-18 | End: 2023-03-18

## 2023-03-18 RX ORDER — OXYBUTYNIN CHLORIDE 5 MG/1
5 TABLET ORAL DAILY
Status: DISCONTINUED | OUTPATIENT
Start: 2023-03-18 | End: 2023-03-18 | Stop reason: HOSPADM

## 2023-03-18 RX ORDER — SODIUM CHLORIDE 0.9 % (FLUSH) 0.9 %
10 SYRINGE (ML) INJECTION
Status: DISCONTINUED | OUTPATIENT
Start: 2023-03-18 | End: 2023-03-18 | Stop reason: HOSPADM

## 2023-03-18 RX ORDER — FLUOXETINE HYDROCHLORIDE 20 MG/1
80 CAPSULE ORAL DAILY
Status: DISCONTINUED | OUTPATIENT
Start: 2023-03-18 | End: 2023-03-18 | Stop reason: HOSPADM

## 2023-03-18 RX ORDER — MIRTAZAPINE 15 MG/1
30 TABLET, FILM COATED ORAL NIGHTLY
Status: DISCONTINUED | OUTPATIENT
Start: 2023-03-18 | End: 2023-03-18 | Stop reason: HOSPADM

## 2023-03-18 RX ORDER — FUROSEMIDE 20 MG/1
20 TABLET ORAL DAILY
Status: DISCONTINUED | OUTPATIENT
Start: 2023-03-18 | End: 2023-03-18 | Stop reason: HOSPADM

## 2023-03-18 RX ORDER — ALBUTEROL SULFATE 0.83 MG/ML
SOLUTION RESPIRATORY (INHALATION)
Status: DISCONTINUED
Start: 2023-03-18 | End: 2023-03-18 | Stop reason: HOSPADM

## 2023-03-18 RX ORDER — PRIMIDONE 50 MG/1
50 TABLET ORAL NIGHTLY
Status: DISCONTINUED | OUTPATIENT
Start: 2023-03-18 | End: 2023-03-18 | Stop reason: HOSPADM

## 2023-03-18 RX ORDER — PANTOPRAZOLE SODIUM 40 MG/1
40 TABLET, DELAYED RELEASE ORAL DAILY
Status: DISCONTINUED | OUTPATIENT
Start: 2023-03-18 | End: 2023-03-18 | Stop reason: HOSPADM

## 2023-03-18 RX ORDER — HYDROCORTISONE ACETATE 25 MG/1
25 SUPPOSITORY RECTAL 2 TIMES DAILY
Status: DISCONTINUED | OUTPATIENT
Start: 2023-03-18 | End: 2023-03-18 | Stop reason: HOSPADM

## 2023-03-18 RX ADMIN — METHYLPREDNISOLONE SODIUM SUCCINATE 80 MG: 40 INJECTION, POWDER, FOR SOLUTION INTRAMUSCULAR; INTRAVENOUS at 01:03

## 2023-03-18 RX ADMIN — PANTOPRAZOLE SODIUM 40 MG: 40 TABLET, DELAYED RELEASE ORAL at 09:03

## 2023-03-18 RX ADMIN — FUROSEMIDE 20 MG: 20 TABLET ORAL at 09:03

## 2023-03-18 RX ADMIN — AMLODIPINE BESYLATE 5 MG: 5 TABLET ORAL at 09:03

## 2023-03-18 RX ADMIN — PRIMIDONE 50 MG: 50 TABLET ORAL at 01:03

## 2023-03-18 RX ADMIN — ALBUTEROL SULFATE 2.5 MG: 2.5 SOLUTION RESPIRATORY (INHALATION) at 07:03

## 2023-03-18 RX ADMIN — ALPRAZOLAM 2 MG: 0.5 TABLET ORAL at 01:03

## 2023-03-18 RX ADMIN — Medication 6 MG: at 01:03

## 2023-03-18 RX ADMIN — FOLIC ACID 1000 MCG: 1 TABLET ORAL at 09:03

## 2023-03-18 RX ADMIN — HYDROCODONE BITARTRATE AND ACETAMINOPHEN 1 TABLET: 10; 325 TABLET ORAL at 09:03

## 2023-03-18 RX ADMIN — OXYBUTYNIN CHLORIDE 5 MG: 5 TABLET ORAL at 09:03

## 2023-03-18 RX ADMIN — FLUOXETINE 80 MG: 20 CAPSULE ORAL at 09:03

## 2023-03-18 RX ADMIN — HYDROCORTISONE ACETATE 25 MG: 25 SUPPOSITORY RECTAL at 09:03

## 2023-03-18 RX ADMIN — NALOXEGOL OXALATE 25 MG: 25 TABLET, FILM COATED ORAL at 09:03

## 2023-03-18 RX ADMIN — HYDROCODONE BITARTRATE AND ACETAMINOPHEN 1 TABLET: 10; 325 TABLET ORAL at 01:03

## 2023-03-18 NOTE — H&P
[facility] Hospital Medicine History & Physical Examination       Patient Name: Andreea Thompson  MRN: [id number]  Patient Class: OP- Observation   Admission Date: 3/17/2023  4:37 PM  Length of Stay: 0  Admitting Service: Hospital Medicine   Attending Physician: Kevin Wright MD   Primary Care Provider: Jamaal Whitney MD  History source: EMR, patient and/or patient's family    CHIEF COMPLAINT   Abdominal Pain and Rectal Bleeding (Pt presents to the ED with rectal bleeding. Onset x 4 days. States blood is bright red and noted when wiping after voiding. Denies chest pain. States that she does have abd discomfort. Pt takes eliquis)    HISTORY OF PRESENT ILLNESS:   Patient is a 62-year-old female with past medical history of paroxysmal atrial fibrillation on Eliquis, history of DVT/PE, Goodpasture syndrome and COPD on chronic home oxygen, chronic kidney disease, and additional past medical history as below presents to the ER for bright red blood per rectum multiple episodes a day x2 days.  She denies a history of prior GI bleeding or prior colonoscopies.      Arrival to the ER she was afebrile hemodynamically stable her laboratory work overall was unremarkable with a normal H& H.  Due the fact she is chronically anticoagulated hospitalist service has been consulted for admission for further management.    PAST MEDICAL HISTORY:   Hypertension  Hyperlipidemia  PAF on Eliquis  HFpEF (EF 55-60% 9/2021)  COPD on 2 L home O2  Goodpasture syndrome  CKD III  Labial Cancer s/p removal  GERD  Anemia  Anxiety/Depression  Hx: DVT RLE/PE on Eliquis    PAST SURGICAL HISTORY:   ORIF left ankle  ORIF right wrist  ORIF left wrist  Labial removal  Lung biopsy  Hernia repair  Kidney biopsy  Tonsillectomy/Adenoidectomy    ALLERGIES:   Patient has no known allergies.    FAMILY HISTORY:   Reviewed and non-contributory     SOCIAL HISTORY:     Social History     Tobacco Use    Smoking status: Former      Types: Cigarettes     Quit date: 2020     Years since quitting: 3.2     Passive exposure: Current    Smokeless tobacco: Never   Substance Use Topics    Alcohol use: Never        HOME MEDICATIONS:     Prior to Admission medications    Medication Sig Start Date End Date Taking? Authorizing Provider   acetaZOLAMIDE (DIAMOX) 250 MG tablet Take by mouth daily as needed. 8/23/22  Yes Historical Provider   albuterol (PROVENTIL/VENTOLIN HFA) 90 mcg/actuation inhaler Inhale 2 puffs into the lungs every 6 (six) hours as needed. 12/8/21  Yes Historical Provider   albuterol-ipratropium (DUO-NEB) 2.5 mg-0.5 mg/3 mL nebulizer solution Inhale 3 mLs into the lungs every 6 (six) hours as needed.   Yes Historical Provider   ALPRAZolam (XANAX) 2 MG Tab Take 2 mg by mouth 2 (two) times daily. 9/8/22  Yes Historical Provider   amLODIPine (NORVASC) 5 MG tablet Take 1 tablet by mouth once daily. 1/14/22 3/17/23 Yes Historical Provider   cyanocobalamin 1,000 mcg/mL injection every 14 (fourteen) days. 10/20/21  Yes Historical Provider   FLUoxetine 40 MG capsule Take 80 mg by mouth.   Yes Historical Provider   folic acid (FOLVITE) 1 MG tablet Take 1,000 mcg by mouth once daily. 8/23/22  Yes Historical Provider   furosemide (LASIX) 20 MG tablet Take 20 mg by mouth daily as needed. 9/20/21  Yes Historical Provider   HYDROcodone-acetaminophen (NORCO)  mg per tablet Take 1 tablet by mouth 2 (two) times daily as needed. 9/8/22  Yes Historical Provider   mirtazapine (REMERON) 30 MG tablet Take 30 mg by mouth every evening. 8/2/22  Yes Historical Provider   oxybutynin (DITROPAN) 5 MG Tab Take 5 mg by mouth once daily. 8/23/22  Yes Historical Provider   pantoprazole (PROTONIX) 40 MG tablet Take 40 mg by mouth once daily. 8/23/22  Yes Historical Provider   primidone (MYSOLINE) 50 MG Tab Take 1 tablet (50 mg total) by mouth every evening. 10/26/22 10/26/23 Yes Bradley Funez MD   alendronate (FOSAMAX) 70 MG tablet every 30 (thirty) days.  9/29/21   Historical Provider   losartan (COZAAR) 50 MG tablet Take 50 mg by mouth once daily. 8/23/22   Historical Provider   metoprolol tartrate (LOPRESSOR) 25 MG tablet 2 (two) times daily. 9/20/21   Historical Provider   pravastatin (PRAVACHOL) 40 MG tablet Take 40 mg by mouth daily as needed. 8/23/22   Historical Provider   predniSONE (DELTASONE) 5 MG tablet Take 5 mg by mouth once daily. 8/23/22   Historical Provider   rivaroxaban (XARELTO) 20 mg Tab Take 1 tablet (20 mg total) by mouth daily with dinner or evening meal. 9/17/22 10/17/22  Roslyn Anand, DO TAYLOR ELLIPTA 100-62.5-25 mcg DsDv Inhale 1 puff into the lungs once daily. 7/5/22   Historical Provider       REVIEW OF SYSTEMS:   Except as documented, all other systems reviewed and negative     PHYSICAL EXAM:   T 97.3 °F (36.3 °C)   BP (!) 146/81   P 71   RR 18   O2 96 %  GENERAL: awake, alert, oriented and in no acute distress, non-toxic appearing   HEENT: normocephalic atraumatic   NECK: supple   LUNGS:  Diffuse end-expiratory wheezing, no accessory muscle use   CVS: Regular rate and rhythm, normal peripheral perfusion  ABD: Soft, non-tender, non-distended, bowel sounds present  EXTREMITIES: no clubbing or cyanosis  SKIN: Warm, dry.   NEURO: alert and oriented, grossly without focal deficits   PSYCHIATRIC: Cooperative    LABS AND IMAGING:     Recent Labs     03/17/23  1645   WBC 12.9*   RBC 4.94   HGB 14.1   HCT 46.0   MCV 93.1   MCH 28.5   MCHC 30.7*   RDW 16.3        No results for input(s): LACTIC in the last 72 hours.  Recent Labs     03/17/23  1645   INR 0.96     No results for input(s): HGBA1C, CHOL, TRIG, LDL, VLDL, HDL in the last 72 hours.   Recent Labs     03/17/23  1645      K 5.4*   CHLORIDE 105   CO2 25   BUN 18.0   CREATININE 1.22*   GLUCOSE 92   CALCIUM 9.8   ALBUMIN 3.8   GLOBULIN 3.7*   ALKPHOS 143   ALT 30   AST 30   BILITOT 0.3     No results for input(s): BNP, CPK, TROPONINI in the last 72 hours.       X-Ray  Abdomen AP 1 View (KUB)  Narrative: EXAMINATION:  XR ABDOMEN AP 1 VIEW    CLINICAL HISTORY:  Unspecified abdominal pain    TECHNIQUE:  AP View(s) of the abdomen was performed.    COMPARISON:  None.    FINDINGS:  No dilated bowel loops. No evidence of obstruction.    Old left rib deformities.  Impression: No acute abdominal radiographic abnormality.    Electronically signed by: Kellen Gray  Date:    03/17/2023  Time:    17:43      ASSESSMENT & PLAN:   Hematochezia in setting of chronic anticoagulation  Mild exacerbation of COPD  Hypertension  Hyperlipidemia  PAF on Xarelto   HFpEF (EF 55-60% 9/2021)  COPD on 2 L home O2  Goodpasture syndrome  CKD III  Labial Cancer s/p removal  GERD  Anemia  Anxiety/Depression  Hx: DVT RLE/PE     - hold xarelto, trend h/h  - GI consult   - monitor for further bleeding   - nebs prn, solumedrol     DVT prophylaxis: scds tonight holding Eliquis  Code status: full     If patient was admitted under observational status it is with my approval/permission.     At least 55 min was spent on this history and physical.  Time seen: 11PM 3/17/23  Kevin Wright MD

## 2023-03-18 NOTE — ED PROVIDER NOTES
"Encounter Date: 3/17/2023    SCRIBE #1 NOTE: I, Yarely Sandoval, am scribing for, and in the presence of,  West Swain MD. I have scribed the following portions of the note - Other sections scribed: HPI,ROS,PE.     History     Chief Complaint   Patient presents with    Abdominal Pain    Rectal Bleeding     Pt presents to the ED with rectal bleeding. Onset x 4 days. States blood is bright red and noted when wiping after voiding. Denies chest pain. States that she does have abd discomfort. Pt takes eliquis     62-year-old female with past medical history of COPD on O2, CHF, Atrial fibrillation on Eliquis, and internal hemorrhoids presents to ED c/o bright red blood per rectum onset 4x days. Associated symptoms include abdominal pain and rectal pain. She denies n/v. States bleeding is "a cup full at a time." Pt established with PCP Jamaal Whitney MD.    The history is provided by the patient. No  was used.   Abdominal Pain  The current episode started several days ago. The problem has not changed since onset.The abdominal pain is generalized. The abdominal pain does not radiate. The other symptoms of the illness include hematochezia. The other symptoms of the illness do not include fever, fatigue, shortness of breath, nausea, vomiting, diarrhea or dysuria.   The hematochezia began 3 to 5 days ago.   Symptoms associated with the illness do not include chills, diaphoresis, constipation, hematuria or back pain. Significant associated medical issues include GERD.   Rectal Bleeding   The current episode started several weeks ago. The problem has been unchanged. Associated symptoms include abdominal pain. Pertinent negatives include no fever, no diarrhea, no nausea, no rectal pain, no vomiting, no hematuria, no chest pain, no headaches, no coughing and no rash.   Review of patient's allergies indicates:  No Known Allergies  Past Medical History:   Diagnosis Date    COPD (chronic obstructive " pulmonary disease)     Paroxysmal atrial fibrillation     Tremor      No past surgical history on file.  Family History   Problem Relation Age of Onset    Stroke Mother     COPD Mother     Lung cancer Father      Social History     Tobacco Use    Smoking status: Former     Types: Cigarettes     Quit date: 2020     Years since quitting: 3.2     Passive exposure: Current    Smokeless tobacco: Never   Substance Use Topics    Alcohol use: Never    Drug use: Never     Review of Systems   Constitutional:  Negative for activity change, chills, diaphoresis, fatigue and fever.   HENT:  Negative for congestion, ear pain, sinus pain and sore throat.    Eyes:  Negative for visual disturbance.   Respiratory:  Negative for cough, shortness of breath, wheezing and stridor.    Cardiovascular:  Negative for chest pain, palpitations and leg swelling.   Gastrointestinal:  Positive for abdominal pain, anal bleeding and hematochezia. Negative for constipation, diarrhea, nausea, rectal pain and vomiting.   Genitourinary:  Negative for dysuria and hematuria.   Musculoskeletal:  Negative for arthralgias, back pain and myalgias.   Skin:  Negative for rash.   Neurological:  Negative for dizziness, syncope, weakness, numbness and headaches.   All other systems reviewed and are negative.    Physical Exam     Initial Vitals [03/17/23 1634]   BP Pulse Resp Temp SpO2   (!) 146/81 71 18 97.3 °F (36.3 °C) 96 %      MAP       --         Physical Exam    Nursing note and vitals reviewed.  Constitutional: No distress.   HENT:   Head: Normocephalic and atraumatic.   Eyes: EOM are normal.   Neck: Trachea normal. Neck supple.   Normal range of motion.  Cardiovascular:  Regular rhythm.           No murmur heard.  Mild tachycardia   Pulmonary/Chest: Breath sounds normal. No respiratory distress. She has no wheezes. She has no rales.   Abdominal: Abdomen is soft. Bowel sounds are normal. She exhibits no distension. There is abdominal tenderness.   Mild  generalized abdominal tenderness to palpation. Ventral wall hernia present to LLQ.  There is no rebound and no guarding.   Genitourinary:    Genitourinary Comments: Digital rectal exam was done with Zehra WALLACE present.  Patient has no stool in the rectal vault.     Musculoskeletal:         General: Normal range of motion.      Cervical back: Normal range of motion and neck supple.      Lumbar back: Normal range of motion.     Neurological: She is alert and oriented to person, place, and time. She has normal strength.   Skin: Skin is warm and dry. No rash noted.   Psychiatric: She has a normal mood and affect.       ED Course   Procedures  Labs Reviewed   COMPREHENSIVE METABOLIC PANEL - Abnormal; Notable for the following components:       Result Value    Potassium Level 5.4 (*)     Creatinine 1.22 (*)     Globulin 3.7 (*)     Albumin/Globulin Ratio 1.0 (*)     All other components within normal limits   CBC WITH DIFFERENTIAL - Abnormal; Notable for the following components:    WBC 12.9 (*)     MCHC 30.7 (*)     IG# 0.25 (*)     All other components within normal limits   APTT - Normal   PROTIME-INR - Normal   CBC W/ AUTO DIFFERENTIAL    Narrative:     The following orders were created for panel order CBC auto differential.  Procedure                               Abnormality         Status                     ---------                               -----------         ------                     CBC with Differential[[id number]]        Abnormal            Final result                 Please view results for these tests on the individual orders.     EKG Readings: (Independently Interpreted)   Initial Reading: No STEMI. Rhythm: Normal Sinus Rhythm. Heart Rate: 79. Ectopy: No Ectopy. Conduction: Normal. ST Segments: Normal ST Segments. T Waves: Normal. T Waves Flipped: V4, V5 and V6. Axis: Right Axis Deviation.     Imaging Results              X-Ray Abdomen AP 1 View (KUB) (Final result)  Result time 03/17/23 17:43:01       Final result by Kellen Gray MD (03/17/23 17:43:01)                   Impression:      No acute abdominal radiographic abnormality.      Electronically signed by: Kellen Gray  Date:    03/17/2023  Time:    17:43               Narrative:    EXAMINATION:  XR ABDOMEN AP 1 VIEW    CLINICAL HISTORY:  Unspecified abdominal pain    TECHNIQUE:  AP View(s) of the abdomen was performed.    COMPARISON:  None.    FINDINGS:  No dilated bowel loops. No evidence of obstruction.    Old left rib deformities.                                       Medications - No data to display  Medical Decision Making:   Initial Assessment:   As per HPI  Differential Diagnosis:   Lower GI bleeding, upper GI bleeding, hemorrhoids  Clinical Tests:   Lab Tests: Ordered and Reviewed       <> Summary of Lab: Potassium is slightly elevated.  Radiological Study: Ordered and Reviewed  ED Management:  On rectal exam, patient has no stool in the rectal vault.  Hemoccult was negative.  Will admit for observation.  Dr. Wright accepts admission.         Medical Decision Making  Amount and/or Complexity of Data Reviewed  Labs: ordered. Decision-making details documented in ED Course.  Radiology: ordered. Decision-making details documented in ED Course.          Scribe Attestation:   Scribe #1: I performed the above scribed service and the documentation accurately describes the services I performed. I attest to the accuracy of the note.    Attending Attestation:           Physician Attestation for Scribe:  Physician Attestation Statement for Scribe #1: I, West Swain MD, reviewed documentation, as scribed by Yarely Sandoval in my presence, and it is both accurate and complete.           ED Course as of 03/17/23 2147   Fri Mar 17, 2023   2130 Paged Hospitalist  [DP]   2143 Dr. Wright, OK to admit [KG]      ED Course User Index  [DP] Rosa Sandoval  [KG] West Swain MD                 Clinical Impression:   Final diagnoses:  [R06.02] Shortness of  breath (Primary)  [R10.9] Abdominal pain  [K92.2] Lower GI bleeding  [Z79.01] Anticoagulated        ED Disposition Condition    Observation Stable                West Swain MD  03/17/23 7864

## 2023-03-18 NOTE — DISCHARGE SUMMARY
[facility] Hospital Medicine Discharge Summary    Admit Date: 3/17/2023  Discharge Date and Time: 3/18/50168:47 PM  Admitting Physician:  Team  Discharging Physician: Sukh Ivy MD.  Primary Care Physician: Jamaal Whitney MD  Consults: Gastroenterology    Discharge Diagnoses:  Hematochezia secondary to hemorrhoids in setting of chronic anticoagulation  Mild exacerbation of COPD: resolved   Hypertension  Hyperlipidemia  PAF on Xarelto   HFpEF (EF 55-60% 9/2021)  COPD on 2 L home O2  Goodpasture syndrome  CKD III  Labial Cancer s/p removal  GERD  Anemia  Anxiety/Depression  Hx: DVT RLE/PE     Hospital Course:   Patient is a 62-year-old female with past medical history of paroxysmal atrial fibrillation on Eliquis, history of DVT/PE, Goodpasture syndrome and COPD on chronic home oxygen, chronic kidney disease, and additional past medical history as below presents to the ER for bright red blood per rectum multiple episodes a day x2 days.  She denies a history of prior GI bleeding or prior colonoscopies.      Arrival to the ER she was afebrile hemodynamically stable her laboratory work overall was unremarkable with a normal H& H.  Due the fact she is chronically anticoagulated hospitalist service has been consulted for admission for further management. Patients anticoagulation was placed on hold and H&H was monitored. Hb was stable. She was seen by GI team and diagnosed with hemorrhoids. Advised steroid cream and treated opoid constipation with movantix 25 mg po daily. She was stable and lungs was clear. She was discharged home in a stable condition.     Pt was seen and examined on the day of discharge  Vitals:  VITAL SIGNS: 24 HRS MIN & MAX LAST   Temp  Min: 97.3 °F (36.3 °C)  Max: 98 °F (36.7 °C) 97.9 °F (36.6 °C)   BP  Min: 111/77  Max: 153/76 129/85   Pulse  Min: 71  Max: 82  80   Resp  Min: 18  Max: 27 18   SpO2  Min: 89 %  Max: 96 % 95 %       Physical Exam:  Heart  RRR  Lungs clear   Abdomen soft and non tender   Neuro: No FND      Procedures Performed: No admission procedures for hospital encounter.     Significant Diagnostic Studies: See Full reports for all details    Recent Labs   Lab 03/17/23 1645 03/18/23  0457   WBC 12.9* 11.4   RBC 4.94 4.68   HGB 14.1 13.2   HCT 46.0 44.1   MCV 93.1 94.2*   MCH 28.5 28.2   MCHC 30.7* 29.9*   RDW 16.3 16.5    234   MPV 10.4 9.8       Recent Labs   Lab 03/17/23  1645 03/18/23  0457    137   K 5.4* 4.8   CO2 25 22*   BUN 18.0 16.2   CREATININE 1.22* 1.08*   CALCIUM 9.8 9.5   ALBUMIN 3.8 3.5   ALKPHOS 143 128   ALT 30 25   AST 30 24   BILITOT 0.3 0.3        Microbiology Results (last 7 days)       ** No results found for the last 168 hours. **             X-Ray Abdomen AP 1 View (KUB)  Narrative: EXAMINATION:  XR ABDOMEN AP 1 VIEW    CLINICAL HISTORY:  Unspecified abdominal pain    TECHNIQUE:  AP View(s) of the abdomen was performed.    COMPARISON:  None.    FINDINGS:  No dilated bowel loops. No evidence of obstruction.    Old left rib deformities.  Impression: No acute abdominal radiographic abnormality.    Electronically signed by: Kellen Gray  Date:    03/17/2023  Time:    17:43         Medication List        START taking these medications      hydrocortisone 25 mg suppository  Commonly known as: ANUSOL-HC  Place 1 suppository (25 mg total) rectally 2 (two) times daily. for 10 days     naloxegoL 25 mg tablet  Commonly known as: MOVANTIK  Take 25 mg by mouth once daily.  Start taking on: March 19, 2023            CONTINUE taking these medications      acetaZOLAMIDE 250 MG tablet  Commonly known as: DIAMOX     albuterol 90 mcg/actuation inhaler  Commonly known as: PROVENTIL/VENTOLIN HFA     albuterol-ipratropium 2.5 mg-0.5 mg/3 mL nebulizer solution  Commonly known as: DUO-NEB     ALPRAZolam 2 MG Tab  Commonly known as: XANAX     amLODIPine 5 MG tablet  Commonly known as: NORVASC     cyanocobalamin 1,000 mcg/mL  injection     FLUoxetine 40 MG capsule     folic acid 1 MG tablet  Commonly known as: FOLVITE     furosemide 20 MG tablet  Commonly known as: LASIX     HYDROcodone-acetaminophen  mg per tablet  Commonly known as: NORCO     mirtazapine 30 MG tablet  Commonly known as: REMERON     oxybutynin 5 MG Tab  Commonly known as: DITROPAN     pantoprazole 40 MG tablet  Commonly known as: PROTONIX     primidone 50 MG Tab  Commonly known as: MYSOLINE  Take 1 tablet (50 mg total) by mouth every evening.     rivaroxaban 20 mg Tab  Commonly known as: XARELTO  Take 1 tablet (20 mg total) by mouth daily with dinner or evening meal.               Where to Get Your Medications        These medications were sent to [facility] - [facility] [address], [address]  [address] [facility] [address]      Phone: [phone]   hydrocortisone 25 mg suppository  naloxegoL 25 mg tablet          Explained in detail to the patient about the discharge plan, medications, and follow-up visits. Pt understands and agrees with the treatment plan  Discharge Disposition: Home-Health Care [facility]   Discharged Condition: stable  Diet- Cardiac   Dietary Orders (From admission, onward)       Start     Ordered    03/18/23 0746  Diet heart healthy  Diet effective now         03/18/23 0747                   Medications Per DC med rec  Activities as tolerated   Follow-up Information       Jamaal Whitney MD Follow up in 2 week(s).    Specialty: Family Medicine  Contact information:  [address]  [place] [address]  [phone]                           For further questions contact hospitalist office    Discharge time 33 minutes    For worsening symptoms, chest pain, shortness of breath, increased abdominal pain, high grade fever, stroke or stroke like symptoms, immediately go to the nearest Emergency Room or call 911 as soon as possible.      Sukh Lozano M.D on 3/18/2023. at 1:47 PM.

## 2023-03-18 NOTE — NURSING
Nurses Note -- 4 Eyes      3/18/2023   8:57 AM      Skin assessed during: Admit      [x] No Pressure Injuries Present    []Prevention Measures Documented      [] Yes- Altered Skin Integrity Present or Discovered   [] LDA Added if Not in Epic (Describe Wound)   [] New Altered Skin Integrity was Present on Admit and Documented in LDA   [] Wound Image Taken    Wound Care Consulted? No    Attending Nurse:  Nella Cardoso RN     Second RN/Staff Member:  Mirela Galindo

## 2023-06-19 PROBLEM — K92.2 LOWER GI BLEEDING: Status: RESOLVED | Noted: 2023-03-18 | Resolved: 2023-06-19

## 2023-10-18 ENCOUNTER — HOSPITAL ENCOUNTER (EMERGENCY)
Facility: HOSPITAL | Age: 63
Discharge: HOME | End: 2023-10-18
Attending: STUDENT IN AN ORGANIZED HEALTH CARE EDUCATION/TRAINING PROGRAM
Payer: MEDICARE

## 2023-10-18 VITALS
SYSTOLIC BLOOD PRESSURE: 127 MMHG | TEMPERATURE: 97 F | HEART RATE: 82 BPM | RESPIRATION RATE: 16 BRPM | HEIGHT: 62 IN | DIASTOLIC BLOOD PRESSURE: 98 MMHG | WEIGHT: 200 LBS | OXYGEN SATURATION: 98 % | BODY MASS INDEX: 36.8 KG/M2

## 2023-10-18 DIAGNOSIS — R07.9 CHEST PAIN: ICD-10-CM

## 2023-10-18 DIAGNOSIS — R07.89 ATYPICAL CHEST PAIN: Primary | ICD-10-CM

## 2023-10-18 LAB
ALBUMIN SERPL-MCNC: 3.5 G/DL (ref 3.4–4.8)
ALBUMIN/GLOB SERPL: 1 RATIO (ref 1.1–2)
ALP SERPL-CCNC: 116 UNIT/L (ref 40–150)
ALT SERPL-CCNC: 14 UNIT/L (ref 0–55)
APPEARANCE UR: CLEAR
AST SERPL-CCNC: 17 UNIT/L (ref 5–34)
BACTERIA #/AREA URNS AUTO: NORMAL /HPF
BASOPHILS # BLD AUTO: 0.09 X10(3)/MCL
BASOPHILS NFR BLD AUTO: 0.8 %
BILIRUB SERPL-MCNC: 0.2 MG/DL
BILIRUB UR QL STRIP.AUTO: NEGATIVE
BNP BLD-MCNC: 388.7 PG/ML
BUN SERPL-MCNC: 16.1 MG/DL (ref 9.8–20.1)
CALCIUM SERPL-MCNC: 9.5 MG/DL (ref 8.4–10.2)
CHLORIDE SERPL-SCNC: 106 MMOL/L (ref 98–107)
CO2 SERPL-SCNC: 26 MMOL/L (ref 23–31)
COLOR UR AUTO: YELLOW
CREAT SERPL-MCNC: 1.23 MG/DL (ref 0.55–1.02)
EOSINOPHIL # BLD AUTO: 0.36 X10(3)/MCL (ref 0–0.9)
EOSINOPHIL NFR BLD AUTO: 3.1 %
ERYTHROCYTE [DISTWIDTH] IN BLOOD BY AUTOMATED COUNT: 14.2 % (ref 11.5–17)
GFR SERPLBLD CREATININE-BSD FMLA CKD-EPI: 49 MLS/MIN/1.73/M2
GLOBULIN SER-MCNC: 3.4 GM/DL (ref 2.4–3.5)
GLUCOSE SERPL-MCNC: 85 MG/DL (ref 82–115)
GLUCOSE UR QL STRIP.AUTO: NEGATIVE
HCT VFR BLD AUTO: 40.5 % (ref 37–47)
HGB BLD-MCNC: 12.3 G/DL (ref 12–16)
IMM GRANULOCYTES # BLD AUTO: 0.2 X10(3)/MCL (ref 0–0.04)
IMM GRANULOCYTES NFR BLD AUTO: 1.7 %
KETONES UR QL STRIP.AUTO: NEGATIVE
LEUKOCYTE ESTERASE UR QL STRIP.AUTO: NEGATIVE
LYMPHOCYTES # BLD AUTO: 2.2 X10(3)/MCL (ref 0.6–4.6)
LYMPHOCYTES NFR BLD AUTO: 18.7 %
MCH RBC QN AUTO: 29.4 PG (ref 27–31)
MCHC RBC AUTO-ENTMCNC: 30.4 G/DL (ref 33–36)
MCV RBC AUTO: 96.7 FL (ref 80–94)
MONOCYTES # BLD AUTO: 0.74 X10(3)/MCL (ref 0.1–1.3)
MONOCYTES NFR BLD AUTO: 6.3 %
NEUTROPHILS # BLD AUTO: 8.17 X10(3)/MCL (ref 2.1–9.2)
NEUTROPHILS NFR BLD AUTO: 69.4 %
NITRITE UR QL STRIP.AUTO: NEGATIVE
NRBC BLD AUTO-RTO: 0 %
PH UR STRIP.AUTO: 5.5 [PH]
PLATELET # BLD AUTO: 275 X10(3)/MCL (ref 130–400)
PMV BLD AUTO: 10.6 FL (ref 7.4–10.4)
POTASSIUM SERPL-SCNC: 5.2 MMOL/L (ref 3.5–5.1)
PROT SERPL-MCNC: 6.9 GM/DL (ref 5.8–7.6)
PROT UR QL STRIP.AUTO: ABNORMAL
RBC # BLD AUTO: 4.19 X10(6)/MCL (ref 4.2–5.4)
RBC #/AREA URNS AUTO: <5 /HPF
RBC UR QL AUTO: NEGATIVE
SODIUM SERPL-SCNC: 141 MMOL/L (ref 136–145)
SP GR UR STRIP.AUTO: 1.01 (ref 1–1.03)
SQUAMOUS #/AREA URNS AUTO: <5 /HPF
TROPONIN I SERPL-MCNC: 0.02 NG/ML (ref 0–0.04)
TROPONIN I SERPL-MCNC: 0.03 NG/ML (ref 0–0.04)
UROBILINOGEN UR STRIP-ACNC: 0.2
WBC # SPEC AUTO: 11.76 X10(3)/MCL (ref 4.5–11.5)
WBC #/AREA URNS AUTO: <5 /HPF

## 2023-10-18 PROCEDURE — 83880 ASSAY OF NATRIURETIC PEPTIDE: CPT | Performed by: STUDENT IN AN ORGANIZED HEALTH CARE EDUCATION/TRAINING PROGRAM

## 2023-10-18 PROCEDURE — 99285 EMERGENCY DEPT VISIT HI MDM: CPT | Mod: 25

## 2023-10-18 PROCEDURE — 84484 ASSAY OF TROPONIN QUANT: CPT | Performed by: STUDENT IN AN ORGANIZED HEALTH CARE EDUCATION/TRAINING PROGRAM

## 2023-10-18 PROCEDURE — 81001 URINALYSIS AUTO W/SCOPE: CPT | Performed by: STUDENT IN AN ORGANIZED HEALTH CARE EDUCATION/TRAINING PROGRAM

## 2023-10-18 PROCEDURE — 93010 ELECTROCARDIOGRAM REPORT: CPT | Mod: ,,, | Performed by: INTERNAL MEDICINE

## 2023-10-18 PROCEDURE — 85025 COMPLETE CBC W/AUTO DIFF WBC: CPT | Performed by: STUDENT IN AN ORGANIZED HEALTH CARE EDUCATION/TRAINING PROGRAM

## 2023-10-18 PROCEDURE — 93010 EKG 12-LEAD: ICD-10-PCS | Mod: ,,, | Performed by: INTERNAL MEDICINE

## 2023-10-18 PROCEDURE — 93005 ELECTROCARDIOGRAM TRACING: CPT

## 2023-10-18 PROCEDURE — 80053 COMPREHEN METABOLIC PANEL: CPT | Performed by: STUDENT IN AN ORGANIZED HEALTH CARE EDUCATION/TRAINING PROGRAM

## 2023-10-18 NOTE — DISCHARGE INSTRUCTIONS
Thanks for letting us take care of you today!  It is our goal to give you courteous care and to keep you comfortable and informed, if you have any questions before you leave I will be happy to try and answer them.    Here is some advice after your visit:    Your visit in the emergency department is NOT definitive care - please follow-up with your primary care doctor and/or specialist within 1-2 days. Please return to the emergency department if you develop worsening symptoms including: fever, chills, chest pain, shortness of breath, weakness, numbness, tingling, nausea, vomiting, inability to eat, drink, or take your medication. Please return if you have any worsening in your condition or if you have any other concerns.    If you had radiology exams like an XRAY or CT in the emergency Department the interpreation on them may be preliminary - there may be less time sensitive findings on the reports please obtain these reports within 24 hours from the hospital or by using your out on your mobile phone to access records.  Bring these to your primary care doctor and/or specialist for further review of incidental findings.    Please review any LAB WORK from your visit today with your primary care physician.    You are welcome to return emergency department any time for any worsening symptoms.

## 2023-10-18 NOTE — ED PROVIDER NOTES
"Encounter Date: 10/18/2023    SCRIBE #1 NOTE: I, April Kline, am scribing for, and in the presence of,  Butch Grant MD. I have scribed the following portions of the note - Other sections scribed: HPI, ROS, PE, MDM.       History     Chief Complaint   Patient presents with    Chest Pain     Pt to ER via [facility] for CP and SOB for 2 weeks.  Also confusion for several days     63 Y.O. female with a history of COPD and paroxysmal afib presents to the ED for chest pain onset 1 hour ago. Pt states that the chest pain began while exercising and is described as stabbing in her mid-upper back and radiating to her neck and shoulders. Pt also complains of diaphoresis, nausea, vomiting, and SOB upon onset, as well as abdominal pain "5 minutes ago". Pt first stated that she felt better currently and then stated that she feels as though her symptoms are worsening. Also reports diarrhea and dysuria. Denies blood and stool and hematuria. Further denies cardiac hx. Pt lives with her son. Her cardiologist is Deandre Salguero MD. Her PCP is Jamaal Whitney MD.     The history is provided by the patient.     Review of patient's allergies indicates:  No Known Allergies  Past Medical History:   Diagnosis Date    COPD (chronic obstructive pulmonary disease)     Paroxysmal atrial fibrillation     Tremor      No past surgical history on file.  Family History   Problem Relation Age of Onset    Stroke Mother     COPD Mother     Lung cancer Father      Social History     Tobacco Use    Smoking status: Former     Current packs/day: 0.00     Types: Cigarettes     Quit date: 2020     Years since quitting: 3.7     Passive exposure: Current    Smokeless tobacco: Never   Substance Use Topics    Alcohol use: Never    Drug use: Never     Review of Systems   Constitutional:  Positive for diaphoresis.   Respiratory:  Positive for shortness of breath.    Cardiovascular:  Positive for chest pain.   Gastrointestinal:  Positive for abdominal pain, " diarrhea, nausea and vomiting. Negative for blood in stool.   Genitourinary:  Positive for dysuria. Negative for hematuria.       Physical Exam     Initial Vitals [10/18/23 1022]   BP Pulse Resp Temp SpO2   (!) 143/100 74 20 96.8 °F (36 °C) 95 %      MAP       --         Physical Exam    Nursing note and vitals reviewed.  Constitutional: She appears well-developed and well-nourished. She is not diaphoretic. No distress.   HENT:   Head: Normocephalic and atraumatic.   Right Ear: External ear normal.   Left Ear: External ear normal.   Nose: Nose normal.   Eyes: Conjunctivae and EOM are normal. Pupils are equal, round, and reactive to light. Right eye exhibits no discharge. Left eye exhibits no discharge.   Cardiovascular:  Normal rate, regular rhythm, normal heart sounds and intact distal pulses.     Exam reveals no gallop and no friction rub.       No murmur heard.  Pulmonary/Chest: Breath sounds normal. No respiratory distress. She has no wheezes. She has no rhonchi. She has no rales. She exhibits no tenderness.   Speaks in complete sentences.   Abdominal: Abdomen is soft. Bowel sounds are normal. She exhibits no distension and no mass. There is generalized abdominal tenderness.   Mildly tender abdomen. There is no rebound and no guarding.   Musculoskeletal:         General: No edema. Normal range of motion.     Neurological: She is alert and oriented to person, place, and time. No cranial nerve deficit or sensory deficit.   Skin: Skin is warm and dry. Capillary refill takes less than 2 seconds. No erythema. No pallor.   Psychiatric:   Somewhat labile affect.         ED Course   Procedures  Labs Reviewed   COMPREHENSIVE METABOLIC PANEL - Abnormal; Notable for the following components:       Result Value    Potassium Level 5.2 (*)     Creatinine 1.23 (*)     Albumin/Globulin Ratio 1.0 (*)     All other components within normal limits   B-TYPE NATRIURETIC PEPTIDE - Abnormal; Notable for the following components:     Natriuretic Peptide 388.7 (*)     All other components within normal limits   URINALYSIS, REFLEX TO URINE CULTURE - Abnormal; Notable for the following components:    Protein, UA 1+ (*)     All other components within normal limits   CBC WITH DIFFERENTIAL - Abnormal; Notable for the following components:    WBC 11.76 (*)     RBC 4.19 (*)     MCV 96.7 (*)     MCHC 30.4 (*)     MPV 10.6 (*)     IG# 0.20 (*)     All other components within normal limits   TROPONIN I - Normal   TROPONIN I - Normal   URINALYSIS, MICROSCOPIC - Normal   CBC W/ AUTO DIFFERENTIAL    Narrative:     The following orders were created for panel order CBC auto differential.  Procedure                               Abnormality         Status                     ---------                               -----------         ------                     CBC with Differential[[id number]]       Abnormal            Final result                 Please view results for these tests on the individual orders.          Imaging Results              CT Head Without Contrast (Final result)  Result time 10/18/23 11:41:54      Final result by Tiara Pennington MD (10/18/23 11:41:54)                   Impression:      No acute abnormality seen      Electronically signed by: Tiara Pennington  Date:    10/18/2023  Time:    11:41               Narrative:    EXAMINATION:  CT HEAD WITHOUT CONTRAST    CLINICAL HISTORY:  Mental status change, unknown cause;    TECHNIQUE:  Multiple axial images were obtained from the base of the brain to the vertex without contrast administration.  Sagittal and coronal reconstructions were performed. .Automatic exposure control  (AEC) is utilized to reduce patient radiation exposure.    COMPARISON:  None    FINDINGS:  There is no intracranial mass or lesion seen.  No hemorrhage is seen.  No infarct is seen.  The ventricles and basilar cisterns appear normal.  Brain parenchyma appears grossly unremarkable.    Posterior fossa appears  normal.  The calvarium is intact.  The paranasal sinuses appear grossly unremarkable.                                       X-Ray Chest AP Portable (Final result)  Result time 10/18/23 12:18:27      Final result by Juan Miguel Jefferson MD (10/18/23 12:18:27)                   Impression:      Mild congestive changes and small pleural effusions.      Electronically signed by: Juan Miguel Jefferson  Date:    10/18/2023  Time:    12:18               Narrative:    EXAMINATION:  XR CHEST AP PORTABLE    CLINICAL HISTORY:  dyspnea;    TECHNIQUE:  One view    COMPARISON:  September 14, 2022.    FINDINGS:  Cardiopericardial silhouette appearance similar.  Lungs interstitial markings prominence reflect mild congestive process.  Minimal right and small left pleural effusion and lower lung zone atelectasis.  No pneumothorax.                                       Medications - No data to display  Medical Decision Making  The differential diagnosis includes, but is not limited to: Chest pain emergent diagnoses: ACS, PE, dissection, cardiac tamponade, tension pneumothorax.  Chest pain non-emergent diagnoses: Musculoskeletal, trauma, pleurisy, pneumonia, pleural effusion, GERD, other GI, neurologic, psychiatric and other non emergent diagnoses considered.      On re-evaluation patient is resting comfortably she adamantly denies any complaints.  She reports all symptoms resolved that she would like to go home.  She is currently on 2 L nasal cannula she reports this is her home oxygen requirement.  Triage note reports altered mental status but she is not altered to me.  She is awake alert appropriate responds to all my questions that any difficulty.  She is able to ambulate without difficulty back and forth to the bathroom.  Her lab work reveals a minimally elevated troponin of 0.02.  This appears to be at baseline.  She was chest pain-free she is offered admission but declines.  She is requesting discharge.  We will discharge her at this time.   Strict return precautions are given.  Patient voiced understanding Return precautions given.  Questions invited, questions answered to the best my ability.  Patient discharged home condition stable.      Amount and/or Complexity of Data Reviewed  External Data Reviewed: notes.     Details: See ED course  Labs: ordered. Decision-making details documented in ED Course.  Radiology: ordered. Decision-making details documented in ED Course.            Scribe Attestation:   Scribe #1: I performed the above scribed service and the documentation accurately describes the services I performed. I attest to the accuracy of the note.    Attending Attestation:           Physician Attestation for Scribe:  Physician Attestation Statement for Scribe #1: I, Butch Grant MD, reviewed documentation, as scribed by April Kline in my presence, and it is both accurate and complete.             ED Course as of 10/18/23 2128   Wed Oct 18, 2023   1037 EKG from 1024 shows sinus rhythm rate of 84.  .  No obvious ST elevation or depression.  Normal axis.  Significant artifact in V3 V4 V5 limiting interpretation. [MM]   1041 Patient seen 03/17/2023 and this emergency department for rectal bleeding.  Admitted overnight found to have hematochezia secondary to hemorrhoids history of chronic anticoagulation, mild COPD exacerbation.  Heart failure with preserved ejection fraction 55-60% 9/21. [MM]   1048 Initial evaluation patient was well-appearing and resting comfortably.  She complains of chest pain that started an hour ago, shortness breath that began an hour ago as well.  Abdominal pain that began 5 minutes prior to me walking in the room.  Triage note reports chest pain shortness breath for 2 weeks.  She is in no distress on my initial evaluation.  Does appear to be somewhat confused and somewhat labile affect. [MM]   1059 Repeat EKG from 1048 shows sinus rhythm rate of 82.  .  Right axis deviation.  No ST elevation or  depression.  Low voltage QRS complexes.  Resolution of artifact in V3 V4 V5.   [MM]   1128 X-Ray Chest AP Portable  Hazy infiltrates bilaterally right-greater-than-left.  Similar to prior. [MM]   1201 CBC auto differential(!)  Mild leukocytosis.  No significant anemia.  No left shift. [MM]   1414 Troponin I: 0.022 [MM]   1414 BNP(!): 388.7 [MM]      ED Course User Index  [MM] Butch Grant MD                    Clinical Impression:   Final diagnoses:  [R07.9] Chest pain  [R07.89] Atypical chest pain (Primary)        ED Disposition Condition    Discharge Stable          ED Prescriptions    None       Follow-up Information       Follow up With Specialties Details Why Contact Info    Jamaal Whitney MD Family Medicine Call   [address] [place] [address]  [phone]               Butch Grant MD  10/18/23 6704

## 2024-03-25 PROBLEM — C44.92 SCC (SQUAMOUS CELL CARCINOMA): Status: ACTIVE | Noted: 2024-03-25

## 2024-03-25 PROBLEM — C51.9 VULVAR CANCER: Status: ACTIVE | Noted: 2024-03-25

## 2024-03-26 RX ORDER — OXYCODONE AND ACETAMINOPHEN 5; 325 MG/1; MG/1
1 TABLET ORAL EVERY 4 HOURS PRN
Qty: 5 TABLET | Refills: 0 | Status: SHIPPED | OUTPATIENT
Start: 2024-03-26